# Patient Record
Sex: MALE | Race: BLACK OR AFRICAN AMERICAN | Employment: FULL TIME | URBAN - METROPOLITAN AREA
[De-identification: names, ages, dates, MRNs, and addresses within clinical notes are randomized per-mention and may not be internally consistent; named-entity substitution may affect disease eponyms.]

---

## 2017-02-28 ENCOUNTER — OFFICE VISIT (OUTPATIENT)
Dept: INTERNAL MEDICINE CLINIC | Age: 52
End: 2017-02-28

## 2017-02-28 VITALS
HEIGHT: 72 IN | DIASTOLIC BLOOD PRESSURE: 82 MMHG | OXYGEN SATURATION: 96 % | SYSTOLIC BLOOD PRESSURE: 120 MMHG | RESPIRATION RATE: 16 BRPM | HEART RATE: 65 BPM | BODY MASS INDEX: 33.51 KG/M2 | TEMPERATURE: 98.2 F | WEIGHT: 247.4 LBS

## 2017-02-28 DIAGNOSIS — J30.89 ENVIRONMENTAL AND SEASONAL ALLERGIES: ICD-10-CM

## 2017-02-28 DIAGNOSIS — N52.9 ERECTILE DYSFUNCTION, UNSPECIFIED ERECTILE DYSFUNCTION TYPE: ICD-10-CM

## 2017-02-28 DIAGNOSIS — E78.00 HIGH CHOLESTEROL: ICD-10-CM

## 2017-02-28 DIAGNOSIS — R73.03 PREDIABETES: Primary | ICD-10-CM

## 2017-02-28 RX ORDER — SILDENAFIL CITRATE 20 MG/1
40-60 TABLET ORAL
Qty: 20 TAB | Refills: 5 | Status: SHIPPED | OUTPATIENT
Start: 2017-02-28 | End: 2017-03-10

## 2017-02-28 NOTE — PROGRESS NOTES
Patient received paperwork for advance directive during previous visit but has not completed at this time     Reviewed record In preparation for visit and have obtained necessary documentation      1. Have you been to the ER, urgent care clinic since your last visit? Hospitalized since your last visit? NO    2. Have you seen or consulted any other health care providers outside of the [address] [place] [address] since your last visit? Include any pap smears or colon screening.  NO

## 2017-02-28 NOTE — PATIENT INSTRUCTIONS
Try taking allegra (fexofenadine) 180mg daily to see if it helps your cough. Allergies: Care Instructions  Your Care Instructions  Allergies occur when your body's defense system (immune system) overreacts to certain substances. The immune system treats a harmless substance as if it were a harmful germ or virus. Many things can cause this overreaction, including pollens, medicine, food, dust, animal dander, and mold. Allergies can be mild or severe. Mild allergies can be managed with home treatment. But medicine may be needed to prevent problems. Managing your allergies is an important part of staying healthy. Your doctor may suggest that you have allergy testing to help find out what is causing your allergies. When you know what things trigger your symptoms, you can avoid them. This can prevent allergy symptoms and other health problems. For severe allergies that cause reactions that affect your whole body (anaphylactic reactions), your doctor may prescribe a shot of epinephrine to carry with you in case you have a severe reaction. Learn how to give yourself the shot and keep it with you at all times. Make sure it is not . Follow-up care is a key part of your treatment and safety. Be sure to make and go to all appointments, and call your doctor if you are having problems. It's also a good idea to know your test results and keep a list of the medicines you take. How can you care for yourself at home? · If you have been told by your doctor that dust or dust mites are causing your allergy, decrease the dust around your bed:  [facility] sheets, pillowcases, and other bedding in hot water every week. ¨ Use dust-proof covers for pillows, duvets, and mattresses. Avoid plastic covers because they tear easily and do not \"breathe. \" Wash as instructed on the label. ¨ Do not use any blankets and pillows that you do not need. ¨ Use blankets that you can wash in your washing machine.   ¨ Consider removing drapes and carpets, which attract and hold dust, from your bedroom. · If you are allergic to house dust and mites, do not use home humidifiers. Your doctor can suggest ways you can control dust and mites. · Look for signs of cockroaches. Cockroaches cause allergic reactions. Use cockroach baits to get rid of them. Then, clean your home well. Cockroaches like areas where grocery bags, newspapers, empty bottles, or cardboard boxes are stored. Do not keep these inside your home, and keep trash and food containers sealed. Seal off any spots where cockroaches might enter your home. · If you are allergic to mold, get rid of furniture, rugs, and drapes that smell musty. Check for mold in the bathroom. · If you are allergic to outdoor pollen or mold spores, use air-conditioning. Change or clean all filters every month. Keep windows closed. · If you are allergic to pollen, stay inside when pollen counts are high. Use a vacuum  with a HEPA filter or a double-thickness filter at least two times each week. · Stay inside when air pollution is bad. Avoid paint fumes, perfumes, and other strong odors. · Avoid conditions that make your allergies worse. Stay away from smoke. Do not smoke or let anyone else smoke in your house. Do not use fireplaces or wood-burning stoves. · If you are allergic to your pets, change the air filter in your furnace every month. Use high-efficiency filters. · If you are allergic to pet dander, keep pets outside or out of your bedroom. Old carpet and cloth furniture can hold a lot of animal dander. You may need to replace them. When should you call for help? Give an epinephrine shot if:  · You think you are having a severe allergic reaction. · You have symptoms in more than one body area, such as mild nausea and an itchy mouth. After giving an epinephrine shot call 911, even if you feel better. Call 911 if:  · You have symptoms of a severe allergic reaction.  These may include:  ¨ Sudden raised, red areas (hives) all over your body. ¨ Swelling of the throat, mouth, lips, or tongue. ¨ Trouble breathing. ¨ Passing out (losing consciousness). Or you may feel very lightheaded or suddenly feel weak, confused, or restless. · You have been given an epinephrine shot, even if you feel better. Call your doctor now or seek immediate medical care if:  · You have symptoms of an allergic reaction, such as:  ¨ A rash or hives (raised, red areas on the skin). ¨ Itching. ¨ Swelling. ¨ Belly pain, nausea, or vomiting. Watch closely for changes in your health, and be sure to contact your doctor if:  · You do not get better as expected. Where can you learn more? Go to http://dee dee-miko.info/. Enter G755 in the search box to learn more about \"Allergies: Care Instructions. \"  Current as of: February 12, 2016  Content Version: 11.1  © 1553-9355 [employer]. Care instructions adapted under license by [employer] (which disclaims liability or warranty for this information). If you have questions about a medical condition or this instruction, always ask your healthcare professional. [address] any warranty or liability for your use of this information.

## 2017-02-28 NOTE — PROGRESS NOTES
HPI  Mr. Navjot Perez is a 54y.o. year old male, he is seen today for follow up high cholesterol, IFG, weight. Had cough with chest tightness, started about 3 weeks - getting better since he started taking theraflu. Sometimes cough is productive, no postnasal drip. No sob, f/c. No wheezing. Nasal congestion off and on, none persistent. Is taking pravastatin for several months, no rash. Has lost a few pounds - eating more grilled food, baked food, more vegetables and fruits - avoiding fast food. Says cialis worked for ED, but too expensive. Would like to try viagra again. Chief Complaint   Patient presents with    Cholesterol Problem     Room 1// fasting    Weight Management        Prior to Admission medications    Medication Sig Start Date End Date Taking? Authorizing Provider   sildenafil (REVATIO) 20 mg tablet Take 2-3 Tabs by mouth daily as needed. Indications: Erectile Dysfunction 2/28/17  Yes Tonny Hill MD   pravastatin (PRAVACHOL) 20 mg tablet TAKE 1 TAB BY MOUTH NIGHTLY. 8/17/16  Yes Mathieu Chery MD   triamcinolone acetonide (KENALOG) 0.1 % ointment Apply  to affected area two (2) times a day. use thin layer 4/21/16  Yes Tonny Hill MD   diclofenac (VOLTAREN) 1 % gel Apply 4 g to affected area four (4) times daily as needed for Pain. 3/21/16  Yes Tonny Hill MD   calcium carbonate (TUMS) 200 mg calcium (500 mg) Chew Take 1 Tab by mouth daily. Yes Historical Provider         Allergies   Allergen Reactions    Sulfa (Sulfonamide Antibiotics) Hives         REVIEW OF SYSTEMS:  Per HPI    PHYSICAL EXAM:  Visit Vitals    /82    Pulse 65    Temp 98.2 °F (36.8 °C) (Oral)    Resp 16    Ht 6' (1.829 m)    Wt 247 lb 6.4 oz (112.2 kg)    SpO2 96%    BMI 33.55 kg/m2     Constitutional: Appears well-developed and well-nourished. No distress. HENT:   Head: Normocephalic and atraumatic. Eyes: No scleral icterus.    Ears: tm's wnl   Nose: b/l nasal mucosa congestion  Mouth: OP clear without lesions, no pharyngeal exudate  Neck: no lad, no tm, supple   Cardiovascular: Normal S1/S2, regular rhythm. No murmurs, rubs, or gallops. Pulmonary/Chest: Effort normal and breath sounds normal. No respiratory distress. No wheezes, rhonchi, or rales. Ext: No edema. Neurological: Alert. Psychiatric: Normal mood and affect. Behavior is normal.     Lab Results   Component Value Date/Time    Sodium 138 10/21/2016 09:26 AM    Potassium 4.4 10/21/2016 09:26 AM    Chloride 99 10/21/2016 09:26 AM    CO2 25 10/21/2016 09:26 AM    Glucose 102 10/21/2016 09:26 AM    BUN 14 10/21/2016 09:26 AM    Creatinine 1.13 10/21/2016 09:26 AM    BUN/Creatinine ratio 12 10/21/2016 09:26 AM    GFR est AA 88 10/21/2016 09:26 AM    GFR est non-AA 76 10/21/2016 09:26 AM    Calcium 9.4 10/21/2016 09:26 AM    Bilirubin, total 0.3 10/21/2016 09:26 AM    AST (SGOT) 19 10/21/2016 09:26 AM    Alk. phosphatase 64 10/21/2016 09:26 AM    Protein, total 6.8 10/21/2016 09:26 AM    Albumin 4.5 10/21/2016 09:26 AM    A-G Ratio 2.0 10/21/2016 09:26 AM    ALT (SGPT) 20 10/21/2016 09:26 AM     Lab Results   Component Value Date/Time    Hemoglobin A1c 6.0 04/21/2016 11:04 AM    Hemoglobin A1c 5.9 10/05/2015 11:27 AM    Hemoglobin A1c 5.9 07/24/2015 11:39 AM      Lab Results   Component Value Date/Time    Cholesterol, total 257 10/21/2016 09:26 AM    HDL Cholesterol 49 10/21/2016 09:26 AM    LDL, calculated 158 10/21/2016 09:26 AM    VLDL, calculated 50 10/21/2016 09:26 AM    Triglyceride 249 10/21/2016 09:26 AM          ASSESSMENT/PLAN  Tran Richmond was seen today for cholesterol problem and weight management.     Diagnoses and all orders for this visit:    Prediabetes  -     HEMOGLOBIN A1C WITH EAG  Has made dietary changes - check a1c  High cholesterol  -     METABOLIC PANEL, COMPREHENSIVE  -     LIPID PANEL    BMI 33.0-33.9,adult  Continue portion control, exercise  Environmental and seasonal allergies  With cough - nasal congestion - try allegra 180mg daily prn  Erectile dysfunction, unspecified erectile dysfunction type  -     sildenafil (REVATIO) 20 mg tablet; Take 2-3 Tabs by mouth daily as needed. Indications: Erectile Dysfunction  Try revatio - normally less expensive            There are no preventive care reminders to display for this patient. Follow-up Disposition:  Return in about 6 months (around 8/28/2017), or if symptoms worsen or fail to improve, for bp, chol, weight, ifg.       Reviewed plan of care. Patient has provided input and agrees with goals. The nurse provided the patient and/or family with advanced directive information if needed and encouraged the patient to provide a copy to the office when available.

## 2017-02-28 NOTE — MR AVS SNAPSHOT
Visit Information Date & Time Provider Department Dept. Phone Encounter #  
 2/28/2017 11:00 AM Corona Pena MD [facility] [phone] [id number] Follow-up Instructions Return in about 6 months (around 8/28/2017), or if symptoms worsen or fail to improve, for bp, chol, weight, ifg. Upcoming Health Maintenance Date Due DTaP/Tdap/Td series (2 - Td) 3/30/2021 Allergies as of 2/28/2017  Review Complete On: 2/28/2017 By: Corona Pena MD  
  
 Severity Noted Reaction Type Reactions Sulfa (Sulfonamide Antibiotics)  03/29/2011    Hives Current Immunizations  Reviewed on 3/18/2015 Name Date TDAP Vaccine 3/30/2011 Not reviewed this visit You Were Diagnosed With   
  
 Codes Comments Prediabetes    -  Primary ICD-10-CM: R73.03 
ICD-9-CM: 790.29 High cholesterol     ICD-10-CM: E78.00 ICD-9-CM: 272.0 BMI 33.0-33.9,adult     ICD-10-CM: V61.41 
ICD-9-CM: V85.33 Environmental and seasonal allergies     ICD-10-CM: J30.89 ICD-9-CM: 477.8 Erectile dysfunction, unspecified erectile dysfunction type     ICD-10-CM: N52.9 ICD-9-CM: 607.84 Vitals BP  
  
  
  
  
  
 120/82 Vitals History BMI and BSA Data Body Mass Index Body Surface Area  
 33.55 kg/m 2 2.39 m 2 Preferred Pharmacy Pharmacy Name Phone [phone] [phone] Your Updated Medication List  
  
   
This list is accurate as of: 2/28/17 11:29 AM.  Always use your most recent med list.  
  
  
  
  
 calcium carbonate 200 mg calcium (500 mg) Sujey Tafoya Commonly known as:  TUMS Take 1 Tab by mouth daily. diclofenac 1 % Gel Commonly known as:  VOLTAREN Apply 4 g to affected area four (4) times daily as needed for Pain.  
  
 pravastatin 20 mg tablet Commonly known as:  PRAVACHOL  
TAKE 1 TAB BY MOUTH NIGHTLY. sildenafil 20 mg tablet Commonly known as:  REVATIO Take 2-3 Tabs by mouth daily as needed. Indications: Erectile Dysfunction  
  
 triamcinolone acetonide 0.1 % ointment Commonly known as:  KENALOG Apply  to affected area two (2) times a day. use thin layer Prescriptions Sent to Pharmacy Refills  
 sildenafil (REVATIO) 20 mg tablet 5 Sig: Take 2-3 Tabs by mouth daily as needed. Indications: Erectile Dysfunction Class: Normal  
 Pharmacy: [facility] [id number], [address] [place] [address] #: [phone] Route: Oral  
  
We Performed the Following HEMOGLOBIN A1C WITH EAG [96884 CPT(R)] LIPID PANEL [04625 CPT(R)] METABOLIC PANEL, COMPREHENSIVE [63474 CPT(R)] Follow-up Instructions Return in about 6 months (around 8/28/2017), or if symptoms worsen or fail to improve, for bp, chol, weight, ifg.  
  
  
Patient Instructions Try taking allegra (fexofenadine) 180mg daily to see if it helps your cough. Allergies: Care Instructions Your Care Instructions Allergies occur when your body's defense system (immune system) overreacts to certain substances. The immune system treats a harmless substance as if it were a harmful germ or virus. Many things can cause this overreaction, including pollens, medicine, food, dust, animal dander, and mold. Allergies can be mild or severe. Mild allergies can be managed with home treatment. But medicine may be needed to prevent problems. Managing your allergies is an important part of staying healthy. Your doctor may suggest that you have allergy testing to help find out what is causing your allergies. When you know what things trigger your symptoms, you can avoid them. This can prevent allergy symptoms and other health problems.  
For severe allergies that cause reactions that affect your whole body (anaphylactic reactions), your doctor may prescribe a shot of epinephrine to carry with you in case you have a severe reaction. Learn how to give yourself the shot and keep it with you at all times. Make sure it is not . Follow-up care is a key part of your treatment and safety. Be sure to make and go to all appointments, and call your doctor if you are having problems. It's also a good idea to know your test results and keep a list of the medicines you take. How can you care for yourself at home? · If you have been told by your doctor that dust or dust mites are causing your allergy, decrease the dust around your bed: 
[facility] sheets, pillowcases, and other bedding in hot water every week. ¨ Use dust-proof covers for pillows, duvets, and mattresses. Avoid plastic covers because they tear easily and do not \"breathe. \" Wash as instructed on the label. ¨ Do not use any blankets and pillows that you do not need. ¨ Use blankets that you can wash in your washing machine. ¨ Consider removing drapes and carpets, which attract and hold dust, from your bedroom. · If you are allergic to house dust and mites, do not use home humidifiers. Your doctor can suggest ways you can control dust and mites. · Look for signs of cockroaches. Cockroaches cause allergic reactions. Use cockroach baits to get rid of them. Then, clean your home well. Cockroaches like areas where grocery bags, newspapers, empty bottles, or cardboard boxes are stored. Do not keep these inside your home, and keep trash and food containers sealed. Seal off any spots where cockroaches might enter your home. · If you are allergic to mold, get rid of furniture, rugs, and drapes that smell musty. Check for mold in the bathroom. · If you are allergic to outdoor pollen or mold spores, use air-conditioning. Change or clean all filters every month. Keep windows closed. · If you are allergic to pollen, stay inside when pollen counts are high.  Use a vacuum  with a HEPA filter or a double-thickness filter at least two times each week. · Stay inside when air pollution is bad. Avoid paint fumes, perfumes, and other strong odors. · Avoid conditions that make your allergies worse. Stay away from smoke. Do not smoke or let anyone else smoke in your house. Do not use fireplaces or wood-burning stoves. · If you are allergic to your pets, change the air filter in your furnace every month. Use high-efficiency filters. · If you are allergic to pet dander, keep pets outside or out of your bedroom. Old carpet and cloth furniture can hold a lot of animal dander. You may need to replace them. When should you call for help? Give an epinephrine shot if: 
· You think you are having a severe allergic reaction. · You have symptoms in more than one body area, such as mild nausea and an itchy mouth. After giving an epinephrine shot call 911, even if you feel better. Call 911 if: 
· You have symptoms of a severe allergic reaction. These may include: 
¨ Sudden raised, red areas (hives) all over your body. ¨ Swelling of the throat, mouth, lips, or tongue. ¨ Trouble breathing. ¨ Passing out (losing consciousness). Or you may feel very lightheaded or suddenly feel weak, confused, or restless. · You have been given an epinephrine shot, even if you feel better. Call your doctor now or seek immediate medical care if: 
· You have symptoms of an allergic reaction, such as: ¨ A rash or hives (raised, red areas on the skin). ¨ Itching. ¨ Swelling. ¨ Belly pain, nausea, or vomiting. Watch closely for changes in your health, and be sure to contact your doctor if: 
· You do not get better as expected. Where can you learn more? Go to http://dee dee-miko.info/. Enter K454 in the search box to learn more about \"Allergies: Care Instructions. \" Current as of: February 12, 2016 Content Version: 11.1 © 7656-9249 [employer].  Care instructions adapted under license by [address] (which disclaims liability or warranty for this information). If you have questions about a medical condition or this instruction, always ask your healthcare professional. [address] any warranty or liability for your use of this information. Please provide this summary of care documentation to your next provider. Your primary care clinician is listed as Jacqueline Zamora. If you have any questions after today's visit, please call [phone].

## 2017-03-01 LAB
ALBUMIN SERPL-MCNC: 4.4 G/DL (ref 3.5–5.5)
ALBUMIN/GLOB SERPL: 2 {RATIO} (ref 1.1–2.5)
ALP SERPL-CCNC: 60 IU/L (ref 39–117)
ALT SERPL-CCNC: 21 IU/L (ref 0–44)
AST SERPL-CCNC: 22 IU/L (ref 0–40)
BILIRUB SERPL-MCNC: 0.5 MG/DL (ref 0–1.2)
BUN SERPL-MCNC: 12 MG/DL (ref 6–24)
BUN/CREAT SERPL: 11 (ref 9–20)
CALCIUM SERPL-MCNC: 9.1 MG/DL (ref 8.7–10.2)
CHLORIDE SERPL-SCNC: 102 MMOL/L (ref 96–106)
CHOLEST SERPL-MCNC: 197 MG/DL (ref 100–199)
CO2 SERPL-SCNC: 26 MMOL/L (ref 18–29)
CREAT SERPL-MCNC: 1.13 MG/DL (ref 0.76–1.27)
EST. AVERAGE GLUCOSE BLD GHB EST-MCNC: 123 MG/DL
GLOBULIN SER CALC-MCNC: 2.2 G/DL (ref 1.5–4.5)
GLUCOSE SERPL-MCNC: 100 MG/DL (ref 65–99)
HBA1C MFR BLD: 5.9 % (ref 4.8–5.6)
HDLC SERPL-MCNC: 54 MG/DL
INTERPRETATION, 910389: NORMAL
LDLC SERPL CALC-MCNC: 110 MG/DL (ref 0–99)
POTASSIUM SERPL-SCNC: 4.3 MMOL/L (ref 3.5–5.2)
PROT SERPL-MCNC: 6.6 G/DL (ref 6–8.5)
SODIUM SERPL-SCNC: 141 MMOL/L (ref 134–144)
TRIGL SERPL-MCNC: 165 MG/DL (ref 0–149)
VLDLC SERPL CALC-MCNC: 33 MG/DL (ref 5–40)

## 2017-03-10 RX ORDER — SILDENAFIL 50 MG/1
50 TABLET, FILM COATED ORAL AS NEEDED
Qty: 10 TAB | Refills: 5 | Status: SHIPPED | OUTPATIENT
Start: 2017-03-10 | End: 2018-03-01 | Stop reason: DRUGHIGH

## 2017-03-10 NOTE — TELEPHONE ENCOUNTER
Per Anna Saavedra, denied script for Revatio (sildenafil) but states it will cover Viagra (sildenafil), though Viagra may need a PA.

## 2017-03-24 ENCOUNTER — TELEPHONE (OUTPATIENT)
Dept: INTERNAL MEDICINE CLINIC | Age: 52
End: 2017-03-24

## 2017-03-24 NOTE — TELEPHONE ENCOUNTER
Pt is calling to find out the status of his prior auth for [employer]. Pt stated pharmacy faxed over request. Pt would like a call back to find out the status at [phone].

## 2017-03-24 NOTE — TELEPHONE ENCOUNTER
Per Brooke perez, [address] approved with code [id number] at [phone]. [facility] notified and will [facility] and states they will inform pt. Will have $30 copay for 10 tablets.

## 2017-08-28 ENCOUNTER — OFFICE VISIT (OUTPATIENT)
Dept: INTERNAL MEDICINE CLINIC | Age: 52
End: 2017-08-28

## 2017-08-28 VITALS
DIASTOLIC BLOOD PRESSURE: 80 MMHG | RESPIRATION RATE: 16 BRPM | BODY MASS INDEX: 33.54 KG/M2 | HEART RATE: 64 BPM | TEMPERATURE: 97.9 F | SYSTOLIC BLOOD PRESSURE: 126 MMHG | HEIGHT: 72 IN | WEIGHT: 247.6 LBS | OXYGEN SATURATION: 96 %

## 2017-08-28 DIAGNOSIS — E78.00 HIGH CHOLESTEROL: ICD-10-CM

## 2017-08-28 DIAGNOSIS — J30.9 ALLERGIC RHINITIS, UNSPECIFIED ALLERGIC RHINITIS TRIGGER, UNSPECIFIED RHINITIS SEASONALITY: ICD-10-CM

## 2017-08-28 DIAGNOSIS — R73.01 IFG (IMPAIRED FASTING GLUCOSE): Primary | ICD-10-CM

## 2017-08-28 RX ORDER — FLUTICASONE PROPIONATE 50 MCG
2 SPRAY, SUSPENSION (ML) NASAL DAILY
Qty: 1 BOTTLE | Refills: 2 | Status: SHIPPED | OUTPATIENT
Start: 2017-08-28 | End: 2018-07-02 | Stop reason: SDUPTHER

## 2017-08-28 NOTE — PATIENT INSTRUCTIONS
Prediabetes: Care Instructions  Your Care Instructions  Prediabetes is a warning sign that you are at risk for getting type 2 diabetes. It means that your blood sugar is higher than it should be. The food you eat turns into sugar, which your body uses for energy. Normally, an organ called the pancreas makes insulin, which allows the sugar in your blood to get into your body's cells. But when your body can't use insulin the right way, the sugar doesn't move into cells. It stays in your blood instead. This is called insulin resistance. The buildup of sugar in the blood causes prediabetes. The good news is that lifestyle changes may help you get your blood sugar back to normal and help you avoid or delay diabetes. Follow-up care is a key part of your treatment and safety. Be sure to make and go to all appointments, and call your doctor if you are having problems. It's also a good idea to know your test results and keep a list of the medicines you take. How can you care for yourself at home? · Watch your weight. A healthy weight helps your body use insulin properly. · Limit the amount of calories, sweets, and unhealthy fat you eat. Ask your doctor if you should see a dietitian. A registered dietitian can help you create meal plans that fit your lifestyle. · Get at least 30 minutes of exercise on most days of the week. Exercise helps control your blood sugar. It also helps you maintain a healthy weight. Walking is a good choice. You also may want to do other activities, such as running, swimming, cycling, or playing tennis or team sports. · Do not smoke. Smoking can make prediabetes worse. If you need help quitting, talk to your doctor about stop-smoking programs and medicines. These can increase your chances of quitting for good. · If your doctor prescribed medicines, take them exactly as prescribed. Call your doctor if you think you are having a problem with your medicine.  You will get more details on the specific medicines your doctor prescribes. When should you call for help? Watch closely for changes in your health, and be sure to contact your doctor if:  · You have any symptoms of diabetes. These may include:  ¨ Being thirsty more often. ¨ Urinating more. ¨ Being hungrier. ¨ Losing weight. ¨ Being very tired. ¨ Having blurry vision. · You have a wound that will not heal.  · You have an infection that will not go away. · You have problems with your blood pressure. · You want more information about diabetes and how you can keep from getting it. Where can you learn more? Go to http://dee dee-miko.info/. Enter I222 in the search box to learn more about \"Prediabetes: Care Instructions. \"  Current as of: March 13, 2017  Content Version: 11.3  © 8673-8571 [employer]. Care instructions adapted under license by [employer] (which disclaims liability or warranty for this information). If you have questions about a medical condition or this instruction, always ask your healthcare professional. [address] any warranty or liability for your use of this information.

## 2017-08-28 NOTE — MR AVS SNAPSHOT
Visit Information Date & Time Provider Department Dept. Phone Encounter #  
 8/28/2017 10:00 AM Mindy Rdz, [address] [facility] [phone] [id number] Follow-up Instructions Return in about 6 months (around 2/28/2018). Upcoming Health Maintenance Date Due DTaP/Tdap/Td series (2 - Td) 3/30/2021 Allergies as of 8/28/2017  Review Complete On: 8/28/2017 By: Mindy Rdz MD  
  
 Severity Noted Reaction Type Reactions Sulfa (Sulfonamide Antibiotics)  03/29/2011    Hives Current Immunizations  Reviewed on 3/18/2015 Name Date TDAP Vaccine 3/30/2011 Not reviewed this visit You Were Diagnosed With   
  
 Codes Comments IFG (impaired fasting glucose)    -  Primary ICD-10-CM: R73.01 
ICD-9-CM: 790.21 High cholesterol     ICD-10-CM: E78.00 ICD-9-CM: 272.0 Allergic rhinitis, unspecified allergic rhinitis trigger, unspecified rhinitis seasonality     ICD-10-CM: J30.9 ICD-9-CM: 477.9 BMI 33.0-33.9,adult     ICD-10-CM: H29.33 
ICD-9-CM: V85.33 Vitals BP Pulse Temp Resp Height(growth percentile) Weight(growth percentile) 126/80 64 97.9 °F (36.6 °C) (Oral) 16 6' (1.829 m) 247 lb 9.6 oz (112.3 kg) SpO2 BMI Smoking Status 96% 33.58 kg/m2 Never Smoker Vitals History BMI and BSA Data Body Mass Index Body Surface Area 33.58 kg/m 2 2.39 m 2 Preferred Pharmacy Pharmacy Name Phone [phone] [facility] [phone] Your Updated Medication List  
  
   
This list is accurate as of: 8/28/17 10:44 AM.  Always use your most recent med list.  
  
  
  
  
 calcium carbonate 200 mg calcium (500 mg) Martinez Commonly known as:  TUMS Take 1 Tab by mouth daily. fluticasone 50 mcg/actuation nasal spray Commonly known as:  Jennifer Hoot 2 Sprays by Both Nostrils route daily. pravastatin 20 mg tablet Commonly known as:  PRAVACHOL  
TAKE 1 TAB BY MOUTH NIGHTLY.  
  
 sildenafil citrate 50 mg tablet Commonly known as:  VIAGRA Take 1 Tab by mouth as needed. Prescriptions Sent to Pharmacy Refills  
 fluticasone (FLONASE) 50 mcg/actuation nasal spray 2 Si Sprays by Both Nostrils route daily. Class: Normal  
 Pharmacy: [address] #: [phone] Route: Both Nostrils We Performed the Following HEMOGLOBIN A1C WITH EAG [10919 CPT(R)] LIPID PANEL [69384 CPT(R)] METABOLIC PANEL, COMPREHENSIVE [19374 CPT(R)] Follow-up Instructions Return in about 6 months (around 2018). Patient Instructions Prediabetes: Care Instructions Your Care Instructions Prediabetes is a warning sign that you are at risk for getting type 2 diabetes. It means that your blood sugar is higher than it should be. The food you eat turns into sugar, which your body uses for energy. Normally, an organ called the pancreas makes insulin, which allows the sugar in your blood to get into your body's cells. But when your body can't use insulin the right way, the sugar doesn't move into cells. It stays in your blood instead. This is called insulin resistance. The buildup of sugar in the blood causes prediabetes. The good news is that lifestyle changes may help you get your blood sugar back to normal and help you avoid or delay diabetes. Follow-up care is a key part of your treatment and safety. Be sure to make and go to all appointments, and call your doctor if you are having problems. It's also a good idea to know your test results and keep a list of the medicines you take. How can you care for yourself at home? · Watch your weight. A healthy weight helps your body use insulin properly. · Limit the amount of calories, sweets, and unhealthy fat you eat.  Ask your doctor if you should see a dietitian. A registered dietitian can help you create meal plans that fit your lifestyle. · Get at least 30 minutes of exercise on most days of the week. Exercise helps control your blood sugar. It also helps you maintain a healthy weight. Walking is a good choice. You also may want to do other activities, such as running, swimming, cycling, or playing tennis or team sports. · Do not smoke. Smoking can make prediabetes worse. If you need help quitting, talk to your doctor about stop-smoking programs and medicines. These can increase your chances of quitting for good. · If your doctor prescribed medicines, take them exactly as prescribed. Call your doctor if you think you are having a problem with your medicine. You will get more details on the specific medicines your doctor prescribes. When should you call for help? Watch closely for changes in your health, and be sure to contact your doctor if: 
· You have any symptoms of diabetes. These may include: ¨ Being thirsty more often. ¨ Urinating more. ¨ Being hungrier. ¨ Losing weight. ¨ Being very tired. ¨ Having blurry vision. · You have a wound that will not heal. 
· You have an infection that will not go away. · You have problems with your blood pressure. · You want more information about diabetes and how you can keep from getting it. Where can you learn more? Go to http://dee dee-miko.info/. Enter I222 in the search box to learn more about \"Prediabetes: Care Instructions. \" Current as of: March 13, 2017 Content Version: 11.3 © 1957-9294 [employer]. Care instructions adapted under license by [employer] (which disclaims liability or warranty for this information). If you have questions about a medical condition or this instruction, always ask your healthcare professional. [address] any warranty or liability for your use of this information. Introducing \[facility] [place] [facility]\"" & HEALTH SERVICES! Dear Roque Ascencio: Thank you for requesting a [employer] account. Our records indicate that you have previously registered for a [employer] account but its currently inactive. Please call our [employer] support line at [phone]. Additional Information If you have questions, please visit the Frequently Asked Questions section of the [employer] website at [email or web address] Remember, [employer] is NOT to be used for urgent needs. For medical emergencies, dial 911. Now available from your iPhone and Android! Please provide this summary of care documentation to your next provider. Your primary care clinician is listed as Jacqueline Zamora. If you have any questions after today's visit, please call [phone].

## 2017-08-28 NOTE — PROGRESS NOTES
Ministerio Mota  Identified pt with two pt identifiers(name and ). Chief Complaint   Patient presents with    Blood Pressure Check     Room 2// Fasting     Cholesterol Problem       1. Have you been to the ER, urgent care clinic since your last visit? Hospitalized since your last visit? NO    2. Have you seen or consulted any other health care providers outside of the [address] [place] [address] since your last visit? Include any pap smears or colon screening. NO      Dr Brenton Montgomery notified of reason for visit and vitals    Patient received paperwork for advance directive during previous visit but has not completed at this time     Reviewed record In preparation for visit, huddled with provider and have obtained necessary documentation      There are no preventive care reminders to display for this patient. Wt Readings from Last 3 Encounters:   17 247 lb 9.6 oz (112.3 kg)   17 247 lb 6.4 oz (112.2 kg)   10/21/16 249 lb 3.2 oz (113 kg)     Temp Readings from Last 3 Encounters:   17 98.2 °F (36.8 °C) (Oral)   10/21/16 98.2 °F (36.8 °C) (Oral)   16 98.4 °F (36.9 °C)     BP Readings from Last 3 Encounters:   17 120/82   10/21/16 128/84   16 148/84     Pulse Readings from Last 3 Encounters:   17 65   10/21/16 (!) 57   16 75         Learning Assessment:  :     Learning Assessment 2014   PRIMARY LEARNER Patient   PRIMARY LANGUAGE ENGLISH   LEARNER PREFERENCE PRIMARY DEMONSTRATION   ANSWERED BY patient   RELATIONSHIP SELF       Depression Screening:  :     PHQ over the last two weeks 3/21/2016   Little interest or pleasure in doing things Not at all   Feeling down, depressed or hopeless Not at all   Total Score PHQ 2 0       Fall Risk Assessment:  :     No flowsheet data found. Abuse Screening:  :     No flowsheet data found.       I have received verbal consent from Ministerio Mota to discuss any/all medical information while they are present in the room.    Medication reconciliation up to date and corrected with patient at this time.

## 2017-08-28 NOTE — PROGRESS NOTES
HPI  Mr. Leslie Daniel is a 54y.o. year old male, he is seen today for follow up high cholesterol, IFG, BP. No cp, sob, dizziness, weakness, HA, vision changes. Had been taking allegra for post nasal drip - not helping. Went on vacation about a month ago, noticed postnasal drip, took dayquil and felt better but symptoms have since persisted but improved. Some sneezing and itchy, watery eyes at times. No f/c. Diet is mostly grilled, broiled, baked foods, trying to incorporate more fruits/vegetables, nuts. Very active at work - crawling, climbing, walking. Doesn't check bp. Weight stable. Chief Complaint   Patient presents with    Blood Pressure Check     Room 2// Fasting     Cholesterol Problem        Prior to Admission medications    Medication Sig Start Date End Date Taking? Authorizing Provider   sildenafil citrate (VIAGRA) 50 mg tablet Take 1 Tab by mouth as needed. 3/10/17  Yes Sapna Brock MD   pravastatin (PRAVACHOL) 20 mg tablet TAKE 1 TAB BY MOUTH NIGHTLY. 8/17/16  Yes Chiara Galarza MD   calcium carbonate (TUMS) 200 mg calcium (500 mg) Chew Take 1 Tab by mouth daily. Yes Historical Provider   triamcinolone acetonide (KENALOG) 0.1 % ointment Apply  to affected area two (2) times a day. use thin layer 4/21/16   Sapna Brock MD   diclofenac (VOLTAREN) 1 % gel Apply 4 g to affected area four (4) times daily as needed for Pain. 3/21/16   Sapna Brock MD         Allergies   Allergen Reactions    Sulfa (Sulfonamide Antibiotics) Hives         REVIEW OF SYSTEMS:  Per HPI    PHYSICAL EXAM:  Visit Vitals    /80    Pulse 64    Temp 97.9 °F (36.6 °C) (Oral)    Resp 16    Ht 6' (1.829 m)    Wt 247 lb 9.6 oz (112.3 kg)    SpO2 96%    BMI 33.58 kg/m2     Constitutional: Appears well-developed and well-nourished. No distress. HENT:   Head: Normocephalic and atraumatic. Eyes: No scleral icterus.    Nose: b/l nasal mucosa congested  Mouth: OP clear without lesions, no pharyngeal exudate  Neck: no lad, no tm, supple   Cardiovascular: Normal S1/S2, regular rhythm. No murmurs, rubs, or gallops. Pulmonary/Chest: Effort normal and breath sounds normal. No respiratory distress. No wheezes, rhonchi, or rales. Ext: No edema. Neurological: Alert. Psychiatric: Normal mood and affect. Behavior is normal.     Lab Results   Component Value Date/Time    Sodium 141 02/28/2017 11:34 AM    Potassium 4.3 02/28/2017 11:34 AM    Chloride 102 02/28/2017 11:34 AM    CO2 26 02/28/2017 11:34 AM    Glucose 100 02/28/2017 11:34 AM    BUN 12 02/28/2017 11:34 AM    Creatinine 1.13 02/28/2017 11:34 AM    BUN/Creatinine ratio 11 02/28/2017 11:34 AM    GFR est AA 88 02/28/2017 11:34 AM    GFR est non-AA 76 02/28/2017 11:34 AM    Calcium 9.1 02/28/2017 11:34 AM    Bilirubin, total 0.5 02/28/2017 11:34 AM    AST (SGOT) 22 02/28/2017 11:34 AM    Alk. phosphatase 60 02/28/2017 11:34 AM    Protein, total 6.6 02/28/2017 11:34 AM    Albumin 4.4 02/28/2017 11:34 AM    A-G Ratio 2.0 02/28/2017 11:34 AM    ALT (SGPT) 21 02/28/2017 11:34 AM     Lab Results   Component Value Date/Time    Hemoglobin A1c 5.9 02/28/2017 11:34 AM    Hemoglobin A1c 6.0 04/21/2016 11:04 AM    Hemoglobin A1c 5.9 10/05/2015 11:27 AM      Lab Results   Component Value Date/Time    Cholesterol, total 197 02/28/2017 11:34 AM    HDL Cholesterol 54 02/28/2017 11:34 AM    LDL, calculated 110 02/28/2017 11:34 AM    VLDL, calculated 33 02/28/2017 11:34 AM    Triglyceride 165 02/28/2017 11:34 AM          ASSESSMENT/PLAN  Diagnoses and all orders for this visit:    1. IFG (impaired fasting glucose)  -     HEMOGLOBIN A1C WITH EAG  Check today - has been eating more low carb diet  2. High cholesterol  -     LIPID PANEL  -     METABOLIC PANEL, COMPREHENSIVE  Check lipids today  3.  Allergic rhinitis, unspecified allergic rhinitis trigger, unspecified rhinitis seasonality  -     fluticasone (FLONASE) 50 mcg/actuation nasal spray; 2 Sprays by Both Nostrils route daily. 4. BMI 33.0-33.9,adult  Weight stable, suspect BMI high partially due to muscle mass - continue lower carb diet and exercise      There are no preventive care reminders to display for this patient. Follow-up Disposition:  Return in about 6 months (around 2/28/2018). Reviewed plan of care. Patient has provided input and agrees with goals. The nurse provided the patient and/or family with advanced directive information if needed and encouraged the patient to provide a copy to the office when available.

## 2017-08-29 LAB
ALBUMIN SERPL-MCNC: 4.5 G/DL (ref 3.5–5.5)
ALBUMIN/GLOB SERPL: 1.6 {RATIO} (ref 1.2–2.2)
ALP SERPL-CCNC: 74 IU/L (ref 39–117)
ALT SERPL-CCNC: 16 IU/L (ref 0–44)
AST SERPL-CCNC: 18 IU/L (ref 0–40)
BILIRUB SERPL-MCNC: 0.4 MG/DL (ref 0–1.2)
BUN SERPL-MCNC: 16 MG/DL (ref 6–24)
BUN/CREAT SERPL: 15 (ref 9–20)
CALCIUM SERPL-MCNC: 9.4 MG/DL (ref 8.7–10.2)
CHLORIDE SERPL-SCNC: 99 MMOL/L (ref 96–106)
CHOLEST SERPL-MCNC: 280 MG/DL (ref 100–199)
CO2 SERPL-SCNC: 24 MMOL/L (ref 18–29)
CREAT SERPL-MCNC: 1.07 MG/DL (ref 0.76–1.27)
EST. AVERAGE GLUCOSE BLD GHB EST-MCNC: 117 MG/DL
GLOBULIN SER CALC-MCNC: 2.9 G/DL (ref 1.5–4.5)
GLUCOSE SERPL-MCNC: 84 MG/DL (ref 65–99)
HBA1C MFR BLD: 5.7 % (ref 4.8–5.6)
HDLC SERPL-MCNC: 56 MG/DL
INTERPRETATION, 910389: NORMAL
LDLC SERPL CALC-MCNC: 176 MG/DL (ref 0–99)
POTASSIUM SERPL-SCNC: 4.4 MMOL/L (ref 3.5–5.2)
PROT SERPL-MCNC: 7.4 G/DL (ref 6–8.5)
SODIUM SERPL-SCNC: 139 MMOL/L (ref 134–144)
TRIGL SERPL-MCNC: 241 MG/DL (ref 0–149)
VLDLC SERPL CALC-MCNC: 48 MG/DL (ref 5–40)

## 2018-03-01 ENCOUNTER — OFFICE VISIT (OUTPATIENT)
Dept: INTERNAL MEDICINE CLINIC | Age: 53
End: 2018-03-01

## 2018-03-01 VITALS
DIASTOLIC BLOOD PRESSURE: 83 MMHG | OXYGEN SATURATION: 96 % | HEART RATE: 66 BPM | WEIGHT: 253.4 LBS | SYSTOLIC BLOOD PRESSURE: 132 MMHG | BODY MASS INDEX: 34.32 KG/M2 | RESPIRATION RATE: 14 BRPM | HEIGHT: 72 IN | TEMPERATURE: 98 F

## 2018-03-01 DIAGNOSIS — E78.00 HIGH CHOLESTEROL: ICD-10-CM

## 2018-03-01 DIAGNOSIS — N52.9 ERECTILE DYSFUNCTION, UNSPECIFIED ERECTILE DYSFUNCTION TYPE: ICD-10-CM

## 2018-03-01 DIAGNOSIS — Z12.5 PROSTATE CANCER SCREENING: ICD-10-CM

## 2018-03-01 DIAGNOSIS — R73.03 PREDIABETES: ICD-10-CM

## 2018-03-01 DIAGNOSIS — Z12.11 SCREENING FOR COLON CANCER: ICD-10-CM

## 2018-03-01 DIAGNOSIS — R73.01 IFG (IMPAIRED FASTING GLUCOSE): Primary | ICD-10-CM

## 2018-03-01 RX ORDER — SILDENAFIL CITRATE 20 MG/1
20-60 TABLET ORAL
Qty: 30 TAB | Refills: 4 | Status: SHIPPED | OUTPATIENT
Start: 2018-03-01 | End: 2019-04-29 | Stop reason: SDUPTHER

## 2018-03-01 NOTE — MR AVS SNAPSHOT
[address] [place] [address] [address] [address] [phone] Patient: Michael Roman MRN: [id number] :[id number] Visit Information Date & Time Provider Department Dept. Phone Encounter #  
 3/1/2018  8:30 AM MD Mitch Escobar Irvingashli [phone] [id number] Follow-up Instructions Return in about 6 months (around 2018) for chol, ifg. Upcoming Health Maintenance Date Due FOBT Q 1 YEAR AGE 50-75 10/5/2017 DTaP/Tdap/Td series (2 - Td) 3/30/2021 Allergies as of 3/1/2018  Review Complete On: 3/1/2018 By: Cecy Rodriguez LPN Severity Noted Reaction Type Reactions Sulfa (Sulfonamide Antibiotics)  2011    Hives Current Immunizations  Reviewed on 3/18/2015 Name Date TDAP Vaccine 3/30/2011 Not reviewed this visit You Were Diagnosed With   
  
 Codes Comments IFG (impaired fasting glucose)    -  Primary ICD-10-CM: R73.01 
ICD-9-CM: 790.21 Screening for colon cancer     ICD-10-CM: Z12.11 ICD-9-CM: V76.51 Prediabetes     ICD-10-CM: R73.03 
ICD-9-CM: 790.29 High cholesterol     ICD-10-CM: E78.00 ICD-9-CM: 272.0 Prostate cancer screening     ICD-10-CM: Z12.5 ICD-9-CM: V76.44 BMI 34.0-34.9,adult     ICD-10-CM: V27.36 
ICD-9-CM: V85.34 Erectile dysfunction, unspecified erectile dysfunction type     ICD-10-CM: N52.9 ICD-9-CM: 607.84 Vitals BP Pulse Temp Resp Height(growth percentile) Weight(growth percentile) 132/83 (BP 1 Location: Right arm, BP Patient Position: Sitting) 66 98 °F (36.7 °C) (Oral) 14 6' (1.829 m) 253 lb 6.4 oz (114.9 kg) SpO2 BMI Smoking Status 96% 34.37 kg/m2 Never Smoker Vitals History BMI and BSA Data Body Mass Index Body Surface Area  
 34.37 kg/m 2 2.42 m 2 Preferred Pharmacy Pharmacy Name Phone [id number] [phone] Your Updated Medication List  
  
   
This list is accurate as of 3/1/18  9:33 AM.  Always use your most recent med list.  
  
  
  
  
 calcium carbonate 200 mg calcium (500 mg) Pierre Tan Commonly known as:  TUMS Take 1 Tab by mouth daily. fluticasone 50 mcg/actuation nasal spray Commonly known as:  Selena Finders 2 Sprays by Both Nostrils route daily. pravastatin 20 mg tablet Commonly known as:  PRAVACHOL  
TAKE 1 TAB BY MOUTH NIGHTLY.  
  
 sildenafil (antihypertensive) 20 mg tablet Commonly known as:  REVATIO Take 1-3 Tabs by mouth daily as needed. Prescriptions Printed Refills  
 sildenafil, antihypertensive, (REVATIO) 20 mg tablet 4 Sig: Take 1-3 Tabs by mouth daily as needed. Class: Print Route: Oral  
  
We Performed the Following HEMOGLOBIN A1C WITH EAG [37247 CPT(R)] LIPID PANEL [04422 CPT(R)] METABOLIC PANEL, COMPREHENSIVE [54931 CPT(R)] PSA, DIAGNOSTIC (PROSTATE SPECIFIC AG) [id number] CPT(R)] REFERRAL TO GASTROENTEROLOGY [AZO30 Custom] Comments:  
 Please evaluate patient for colonoscopy. Follow-up Instructions Return in about 6 months (around 9/1/2018) for chol, ifg.  
  
  
Referral Information Referral ID Referred By Referred To  
  
 [id number] Marlene VILLEDA [facility] [address] [place], [address] [place] [address] Visits Status Start Date End Date 1 New Request 3/1/18 3/1/19 If your referral has a status of pending review or denied, additional information will be sent to support the outcome of this decision. Introducing [address]! [facility] introduces [employer] patient portal. Now you can access parts of your medical record, email your doctor's office, and request medication refills online.    
 
1. In your internet browser, go to [email or web address] 2. Click on the First Time User? Click Here link in the Sign In box. You will see the New Member Sign Up page. 3. Enter your [employer] Access Code exactly as it appears below. You will not need to use this code after youve completed the sign-up process. If you do not sign up before the expiration date, you must request a new code. · [employer] Access Code: [email or web address] Expires: 5/30/2018  9:26 AM 
 
4. Enter the last four digits of your Social Security Number (xxxx) and Date of Birth (mm/dd/yyyy) as indicated and click Submit. You will be taken to the next sign-up page. 5. Create a [employer] ID. This will be your [employer] login ID and cannot be changed, so think of one that is secure and easy to remember. 6. Create a [employer] password. You can change your password at any time. 7. Enter your Password Reset Question and Answer. This can be used at a later time if you forget your password. 8. Enter your e-mail address. You will receive e-mail notification when new information is available in [address] 9. Click Sign Up. You can now view and download portions of your medical record. 10. Click the Download Summary menu link to download a portable copy of your medical information. If you have questions, please visit the Frequently Asked Questions section of the [employer] website. Remember, [employer] is NOT to be used for urgent needs. For medical emergencies, dial 911. Now available from your iPhone and Android! Please provide this summary of care documentation to your next provider. Your primary care clinician is listed as Jacqueline Zamora. If you have any questions after today's visit, please call [phone].

## 2018-03-01 NOTE — PROGRESS NOTES
[facility]  Identified pt with two pt identifiers(name and ). Chief Complaint   Patient presents with    Blood sugar problem     Room 2// Fasting     Cholesterol Problem       1. Have you been to the ER, urgent care clinic since your last visit? Hospitalized since your last visit? NO    2. Have you seen or consulted any other health care providers outside of the [address] [place] [address] since your last visit? Include any pap smears or colon screening. NO      Dr Guru Barger notified of reason for visit, vitals and flowsheets obtained on patients. Patient received paperwork for advance directive during previous visit but has not completed at this time     Reviewed record In preparation for visit, huddled with provider and have obtained necessary documentation      Health Maintenance Due   Topic    FOBT Q 1 YEAR AGE 50-75        Wt Readings from Last 3 Encounters:   18 253 lb 6.4 oz (114.9 kg)   17 247 lb 9.6 oz (112.3 kg)   17 247 lb 6.4 oz (112.2 kg)     Temp Readings from Last 3 Encounters:   17 97.9 °F (36.6 °C) (Oral)   17 98.2 °F (36.8 °C) (Oral)   10/21/16 98.2 °F (36.8 °C) (Oral)     BP Readings from Last 3 Encounters:   17 126/80   17 120/82   10/21/16 128/84     Pulse Readings from Last 3 Encounters:   17 64   17 65   10/21/16 (!) 57     Vitals:    18 0834   Weight: 253 lb 6.4 oz (114.9 kg)   Height: 6' (1.829 m)   PainSc:   0 - No pain         Learning Assessment:  :     Learning Assessment 2014   PRIMARY LEARNER Patient   PRIMARY LANGUAGE ENGLISH   LEARNER PREFERENCE PRIMARY DEMONSTRATION   ANSWERED BY patient   RELATIONSHIP SELF       Depression Screening:  :     PHQ over the last two weeks 2017   Little interest or pleasure in doing things Not at all   Feeling down, depressed or hopeless Not at all   Total Score PHQ 2 0       Fall Risk Assessment:  :     Fall Risk Assessment, last 12 mths 2017   Able to walk?  Yes Fall in past 12 months? No       Abuse Screening:  :     Abuse Screening Questionnaire 8/28/2017   Do you ever feel afraid of your partner? N   Are you in a relationship with someone who physically or mentally threatens you? N   Is it safe for you to go home? Y       ADL Screening:  :     ADL Assessment 8/28/2017   Feeding yourself No Help Needed   Getting from bed to chair No Help Needed   Getting dressed No Help Needed   Bathing or showering No Help Needed   Walk across the room (includes cane/walker) No Help Needed   Using the telphone No Help Needed   Taking your medications No Help Needed   Preparing meals No Help Needed   Managing money (expenses/bills) No Help Needed   Moderately strenuous housework (laundry) No Help Needed   Shopping for personal items (toiletries/medicines) No Help Needed   Shopping for groceries No Help Needed   Driving No Help Needed   Climbing a flight of stairs No Help Needed   Getting to places beyond walking distances No Help Needed                I have received verbal consent from Lenora Arias to discuss any/all medical information while they are present in the room. Medication reconciliation up to date and corrected with patient at this time.

## 2018-03-01 NOTE — PROGRESS NOTES
HPI  Mr. Melinda Garrison is a 50y.o. year old male, he is seen today for follow up high cholesterol. Has been well since last visit. No chest pain, sob, dizziness, weakness. No change in bowels, melena or brbpr. No change in urinary frequency, urgency or emptying bladder. Active at work, no intentional exercise. Chief Complaint   Patient presents with    Blood sugar problem     Room 2// Fasting     Cholesterol Problem        Prior to Admission medications    Medication Sig Start Date End Date Taking? Authorizing Provider   pravastatin (PRAVACHOL) 20 mg tablet TAKE 1 TAB BY MOUTH NIGHTLY. 9/15/17  Yes Shaan Kay MD   sildenafil citrate (VIAGRA) 50 mg tablet Take 1 Tab by mouth as needed. 3/10/17  Yes Shaan Kay MD   calcium carbonate (TUMS) 200 mg calcium (500 mg) Chew Take 1 Tab by mouth daily. Yes Historical Provider   fluticasone (FLONASE) 50 mcg/actuation nasal spray 2 Sprays by Both Nostrils route daily. 8/28/17   Shaan Kay MD         Allergies   Allergen Reactions    Sulfa (Sulfonamide Antibiotics) Hives         REVIEW OF SYSTEMS:  Per HPI    PHYSICAL EXAM:  Visit Vitals    /83 (BP 1 Location: Right arm, BP Patient Position: Sitting)    Pulse 66    Temp 98 °F (36.7 °C) (Oral)    Resp 14    Ht 6' (1.829 m)    Wt 253 lb 6.4 oz (114.9 kg)    SpO2 96%    BMI 34.37 kg/m2     Constitutional: Appears well-developed and well-nourished. No distress. HENT:   Head: Normocephalic and atraumatic. Eyes: No scleral icterus. Cardiovascular: Normal S1/S2, regular rhythm. No murmurs, rubs, or gallops. Pulmonary/Chest: Effort normal and breath sounds normal. No respiratory distress. No wheezes, rhonchi, or rales. Rectal: normal tone, prostate smooth, nontender, no nodules, not enlarged, no rectal masses   Ext: No edema. Neurological: Alert. Psychiatric: Normal mood and affect.  Behavior is normal.     Lab Results   Component Value Date/Time    Sodium 139 08/28/2017 10:48 AM    Potassium 4.4 08/28/2017 10:48 AM    Chloride 99 08/28/2017 10:48 AM    CO2 24 08/28/2017 10:48 AM    Glucose 84 08/28/2017 10:48 AM    BUN 16 08/28/2017 10:48 AM    Creatinine 1.07 08/28/2017 10:48 AM    BUN/Creatinine ratio 15 08/28/2017 10:48 AM    GFR est AA 94 08/28/2017 10:48 AM    GFR est non-AA 81 08/28/2017 10:48 AM    Calcium 9.4 08/28/2017 10:48 AM    Bilirubin, total 0.4 08/28/2017 10:48 AM    AST (SGOT) 18 08/28/2017 10:48 AM    Alk. phosphatase 74 08/28/2017 10:48 AM    Protein, total 7.4 08/28/2017 10:48 AM    Albumin 4.5 08/28/2017 10:48 AM    A-G Ratio 1.6 08/28/2017 10:48 AM    ALT (SGPT) 16 08/28/2017 10:48 AM     Lab Results   Component Value Date/Time    Hemoglobin A1c 5.7 (H) 08/28/2017 10:48 AM    Hemoglobin A1c 5.9 (H) 02/28/2017 11:34 AM    Hemoglobin A1c 6.0 (H) 04/21/2016 11:04 AM      Lab Results   Component Value Date/Time    Cholesterol, total 280 (H) 08/28/2017 10:48 AM    HDL Cholesterol 56 08/28/2017 10:48 AM    LDL, calculated 176 (H) 08/28/2017 10:48 AM    VLDL, calculated 48 (H) 08/28/2017 10:48 AM    Triglyceride 241 (H) 08/28/2017 10:48 AM          ASSESSMENT/PLAN  Diagnoses and all orders for this visit:    1. IFG (impaired fasting glucose)  -     HEMOGLOBIN A1C WITH EAG    2. Screening for colon cancer  -     REFERRAL TO GASTROENTEROLOGY    3. Prediabetes  Labs today  4. High cholesterol  -     LIPID PANEL  -     METABOLIC PANEL, COMPREHENSIVE  Check labs - no side effects from pravastatin  5. Prostate cancer screening  -     PROSTATE SPECIFIC AG    6. BMI 34.0-34.9,adult  Close to healthy weight due to muscle mass  7. Erectile dysfunction, unspecified erectile dysfunction type  -     sildenafil, antihypertensive, (REVATIO) 20 mg tablet; Take 1-3 Tabs by mouth daily as needed.     Other orders  -     CVD REPORT          Health Maintenance Due   Topic Date Due    FOBT Q 1 YEAR AGE 50-75  10/05/2017        Follow-up Disposition:  Return in about 6 months (around 9/1/2018) for chol, ifg.       Reviewed plan of care. Patient has provided input and agrees with goals. The nurse provided the patient and/or family with advanced directive information if needed and encouraged the patient to provide a copy to the office when available.

## 2018-03-02 LAB
ALBUMIN SERPL-MCNC: 4.5 G/DL (ref 3.5–5.5)
ALBUMIN/GLOB SERPL: 1.6 {RATIO} (ref 1.2–2.2)
ALP SERPL-CCNC: 64 IU/L (ref 39–117)
ALT SERPL-CCNC: 16 IU/L (ref 0–44)
AST SERPL-CCNC: 19 IU/L (ref 0–40)
BILIRUB SERPL-MCNC: 0.5 MG/DL (ref 0–1.2)
BUN SERPL-MCNC: 12 MG/DL (ref 6–24)
BUN/CREAT SERPL: 11 (ref 9–20)
CALCIUM SERPL-MCNC: 9.4 MG/DL (ref 8.7–10.2)
CHLORIDE SERPL-SCNC: 101 MMOL/L (ref 96–106)
CHOLEST SERPL-MCNC: 217 MG/DL (ref 100–199)
CO2 SERPL-SCNC: 25 MMOL/L (ref 18–29)
CREAT SERPL-MCNC: 1.07 MG/DL (ref 0.76–1.27)
EST. AVERAGE GLUCOSE BLD GHB EST-MCNC: 117 MG/DL
GFR SERPLBLD CREATININE-BSD FMLA CKD-EPI: 81 ML/MIN/1.73
GFR SERPLBLD CREATININE-BSD FMLA CKD-EPI: 93 ML/MIN/1.73
GLOBULIN SER CALC-MCNC: 2.8 G/DL (ref 1.5–4.5)
GLUCOSE SERPL-MCNC: 91 MG/DL (ref 65–99)
HBA1C MFR BLD: 5.7 % (ref 4.8–5.6)
HDLC SERPL-MCNC: 50 MG/DL
INTERPRETATION, 910389: NORMAL
LDLC SERPL CALC-MCNC: 134 MG/DL (ref 0–99)
POTASSIUM SERPL-SCNC: 4.5 MMOL/L (ref 3.5–5.2)
PROT SERPL-MCNC: 7.3 G/DL (ref 6–8.5)
PSA SERPL-MCNC: 0.8 NG/ML (ref 0–4)
SODIUM SERPL-SCNC: 142 MMOL/L (ref 134–144)
TRIGL SERPL-MCNC: 166 MG/DL (ref 0–149)
VLDLC SERPL CALC-MCNC: 33 MG/DL (ref 5–40)

## 2018-04-19 ENCOUNTER — OFFICE VISIT (OUTPATIENT)
Dept: INTERNAL MEDICINE CLINIC | Facility: CLINIC | Age: 53
End: 2018-04-19

## 2018-04-19 VITALS
BODY MASS INDEX: 33.94 KG/M2 | DIASTOLIC BLOOD PRESSURE: 78 MMHG | OXYGEN SATURATION: 98 % | RESPIRATION RATE: 18 BRPM | HEIGHT: 72 IN | SYSTOLIC BLOOD PRESSURE: 118 MMHG | WEIGHT: 250.6 LBS | HEART RATE: 68 BPM | TEMPERATURE: 98.2 F

## 2018-04-19 DIAGNOSIS — J40 BRONCHITIS: Primary | ICD-10-CM

## 2018-04-19 RX ORDER — BENZONATATE 200 MG/1
200 CAPSULE ORAL
Qty: 21 CAP | Refills: 0 | Status: SHIPPED | OUTPATIENT
Start: 2018-04-19 | End: 2018-04-26

## 2018-04-19 RX ORDER — AZITHROMYCIN 250 MG/1
TABLET, FILM COATED ORAL
Qty: 6 TAB | Refills: 0 | Status: SHIPPED | OUTPATIENT
Start: 2018-04-19 | End: 2018-04-24

## 2018-04-19 NOTE — PROGRESS NOTES
Moni Pandya  Identified pt with two pt identifiers(name and ). Chief Complaint   Patient presents with    Nasal Congestion     Room 2C// Mucinex DM PRN (last dose x 2 days) // denies fever     Cough     productive, clear/yellow        1. Have you been to the ER, urgent care clinic since your last visit? Hospitalized since your last visit? NO    2. Have you seen or consulted any other health care providers outside of the [address] [place] [address] since your last visit? Include any pap smears or colon screening. NO      Dr Toni Adkins notified of reason for visit, vitals and flowsheets obtained on patients. Patient received paperwork for advance directive during previous visit but has not completed at this time     Reviewed record In preparation for visit, huddled with provider and have obtained necessary documentation      Health Maintenance Due   Topic    FOBT Q 1 YEAR AGE 50-75        Wt Readings from Last 3 Encounters:   18 253 lb 6.4 oz (114.9 kg)   17 247 lb 9.6 oz (112.3 kg)   17 247 lb 6.4 oz (112.2 kg)     Temp Readings from Last 3 Encounters:   18 98 °F (36.7 °C) (Oral)   17 97.9 °F (36.6 °C) (Oral)   17 98.2 °F (36.8 °C) (Oral)     BP Readings from Last 3 Encounters:   18 132/83   17 126/80   17 120/82     Pulse Readings from Last 3 Encounters:   18 66   17 64   17 65     There were no vitals filed for this visit. Learning Assessment:  :     Learning Assessment 2014   PRIMARY LEARNER Patient   PRIMARY LANGUAGE ENGLISH   LEARNER PREFERENCE PRIMARY DEMONSTRATION   ANSWERED BY patient   RELATIONSHIP SELF       Depression Screening:  :     PHQ over the last two weeks 2017   Little interest or pleasure in doing things Not at all   Feeling down, depressed or hopeless Not at all   Total Score PHQ 2 0       Fall Risk Assessment:  :     Fall Risk Assessment, last 12 mths 2017   Able to walk?  Yes   Fall in past 15 months? No       Abuse Screening:  :     Abuse Screening Questionnaire 8/28/2017   Do you ever feel afraid of your partner? N   Are you in a relationship with someone who physically or mentally threatens you? N   Is it safe for you to go home? Y       ADL Screening:  :     ADL Assessment 8/28/2017   Feeding yourself No Help Needed   Getting from bed to chair No Help Needed   Getting dressed No Help Needed   Bathing or showering No Help Needed   Walk across the room (includes cane/walker) No Help Needed   Using the telphone No Help Needed   Taking your medications No Help Needed   Preparing meals No Help Needed   Managing money (expenses/bills) No Help Needed   Moderately strenuous housework (laundry) No Help Needed   Shopping for personal items (toiletries/medicines) No Help Needed   Shopping for groceries No Help Needed   Driving No Help Needed   Climbing a flight of stairs No Help Needed   Getting to places beyond walking distances No Help Needed             I have received verbal consent from Erin Streeter to discuss any/all medical information while they are present in the room. Medication reconciliation up to date and corrected with patient at this time.

## 2018-04-19 NOTE — PROGRESS NOTES
HPI  Mr. Tawnya Simmons is a 50y.o. year old male, he is seen today for stuffy nose (better with flonase), chest congestion with cough, sometimes productive of yellow/clear sputum, not better with mucinex DM. Symptoms x several weeks. No sob. No chest pain, sometimes tight with coughing. Also some sinus pressure. No f/c or sweats. Not getting better. Chief Complaint   Patient presents with    Nasal Congestion     Room 2C// Mucinex DM PRN (last dose x 2 days) // denies fever     Cough     productive, clear/yellow         Prior to Admission medications    Medication Sig Start Date End Date Taking? Authorizing Provider   azithromycin (ZITHROMAX) 250 mg tablet use as directed 4/19/18 4/24/18 Yes Seema Smith MD   benzonatate (TESSALON) 200 mg capsule Take 1 Cap by mouth three (3) times daily as needed for Cough for up to 7 days. 4/19/18 4/26/18 Yes Seema Smith MD   sildenafil, antihypertensive, (REVATIO) 20 mg tablet Take 1-3 Tabs by mouth daily as needed. 3/1/18  Yes Seema Smith MD   pravastatin (PRAVACHOL) 20 mg tablet TAKE 1 TAB BY MOUTH NIGHTLY. 9/15/17  Yes Seema Smith MD   fluticasone (FLONASE) 50 mcg/actuation nasal spray 2 Sprays by Both Nostrils route daily. 8/28/17  Yes Seema Smith MD   calcium carbonate (TUMS) 200 mg calcium (500 mg) Chew Take 1 Tab by mouth daily. Yes Historical Provider         Allergies   Allergen Reactions    Sulfa (Sulfonamide Antibiotics) Hives         REVIEW OF SYSTEMS:  Per HPI    PHYSICAL EXAM:  Visit Vitals    /78    Pulse 68    Temp 98.2 °F (36.8 °C) (Oral)    Resp 18    Ht 6' (1.829 m)    Wt 250 lb 9.6 oz (113.7 kg)    SpO2 98%    BMI 33.99 kg/m2     Constitutional: Appears well-developed and well-nourished. No distress. HENT:   Head: Normocephalic and atraumatic. No sinus tenderness to percussion  Eyes: No scleral icterus.    Ears: tm's wnl   Nose: nares patent, mucosa congested and erythematous  Mouth: OP clear without lesions, no pharyngeal exudate  Neck: no lad, no tm, supple   Cardiovascular: Normal S1/S2, regular rhythm. No murmurs, rubs, or gallops. Pulmonary/Chest: Effort normal and breath sounds normal. No respiratory distress. No wheezes, rhonchi, or rales. Ext: No edema. Neurological: Alert. Psychiatric: Normal mood and affect. Behavior is normal.     Lab Results   Component Value Date/Time    Sodium 142 03/01/2018 09:40 AM    Potassium 4.5 03/01/2018 09:40 AM    Chloride 101 03/01/2018 09:40 AM    CO2 25 03/01/2018 09:40 AM    Glucose 91 03/01/2018 09:40 AM    BUN 12 03/01/2018 09:40 AM    Creatinine 1.07 03/01/2018 09:40 AM    BUN/Creatinine ratio 11 03/01/2018 09:40 AM    GFR est AA 93 03/01/2018 09:40 AM    GFR est non-AA 81 03/01/2018 09:40 AM    Calcium 9.4 03/01/2018 09:40 AM    Bilirubin, total 0.5 03/01/2018 09:40 AM    AST (SGOT) 19 03/01/2018 09:40 AM    Alk. phosphatase 64 03/01/2018 09:40 AM    Protein, total 7.3 03/01/2018 09:40 AM    Albumin 4.5 03/01/2018 09:40 AM    A-G Ratio 1.6 03/01/2018 09:40 AM    ALT (SGPT) 16 03/01/2018 09:40 AM     Lab Results   Component Value Date/Time    Hemoglobin A1c 5.7 (H) 03/01/2018 09:40 AM    Hemoglobin A1c 5.7 (H) 08/28/2017 10:48 AM    Hemoglobin A1c 5.9 (H) 02/28/2017 11:34 AM      Lab Results   Component Value Date/Time    Cholesterol, total 217 (H) 03/01/2018 09:40 AM    HDL Cholesterol 50 03/01/2018 09:40 AM    LDL, calculated 134 (H) 03/01/2018 09:40 AM    VLDL, calculated 33 03/01/2018 09:40 AM    Triglyceride 166 (H) 03/01/2018 09:40 AM          ASSESSMENT/PLAN  Diagnoses and all orders for this visit:    1. Bronchitis  -     azithromycin (ZITHROMAX) 250 mg tablet; use as directed  -     benzonatate (TESSALON) 200 mg capsule; Take 1 Cap by mouth three (3) times daily as needed for Cough for up to 7 days.     given duration of symptoms treat with abx, increase fluids      Health Maintenance Due   Topic Date Due    FOBT Q 1 YEAR AGE 50-75 10/05/2017        Follow-up Disposition:  Return if symptoms worsen or fail to improve. Reviewed plan of care. Patient has provided input and agrees with goals. The nurse provided the patient and/or family with advanced directive information if needed and encouraged the patient to provide a copy to the office when available.

## 2018-04-19 NOTE — PATIENT INSTRUCTIONS
Bronchitis: Care Instructions  Your Care Instructions    Bronchitis is inflammation of the bronchial tubes, which carry air to the lungs. The tubes swell and produce mucus, or phlegm. The mucus and inflamed bronchial tubes make you cough. You may have trouble breathing. Most cases of bronchitis are caused by viruses like those that cause colds. Antibiotics usually do not help and they may be harmful. Bronchitis usually develops rapidly and lasts about 2 to 3 weeks in otherwise healthy people. Follow-up care is a key part of your treatment and safety. Be sure to make and go to all appointments, and call your doctor if you are having problems. It's also a good idea to know your test results and keep a list of the medicines you take. How can you care for yourself at home? · Take all medicines exactly as prescribed. Call your doctor if you think you are having a problem with your medicine. · Get some extra rest.  · Take an over-the-counter pain medicine, such as acetaminophen (Tylenol), ibuprofen (Advil, Motrin), or naproxen (Aleve) to reduce fever and relieve body aches. Read and follow all instructions on the label. · Do not take two or more pain medicines at the same time unless the doctor told you to. Many pain medicines have acetaminophen, which is Tylenol. Too much acetaminophen (Tylenol) can be harmful. · Take an over-the-counter cough medicine that contains dextromethorphan to help quiet a dry, hacking cough so that you can sleep. Avoid cough medicines that have more than one active ingredient. Read and follow all instructions on the label. · Breathe moist air from a humidifier, hot shower, or sink filled with hot water. The heat and moisture will thin mucus so you can cough it out. · Do not smoke. Smoking can make bronchitis worse. If you need help quitting, talk to your doctor about stop-smoking programs and medicines. These can increase your chances of quitting for good.   When should you call for help? Call 911 anytime you think you may need emergency care. For example, call if:  ? · You have severe trouble breathing. ?Call your doctor now or seek immediate medical care if:  ? · You have new or worse trouble breathing. ? · You cough up dark brown or bloody mucus (sputum). ? · You have a new or higher fever. ? · You have a new rash. ? Watch closely for changes in your health, and be sure to contact your doctor if:  ? · You cough more deeply or more often, especially if you notice more mucus or a change in the color of your mucus. ? · You are not getting better as expected. Where can you learn more? Go to http://dee dee-miko.info/. Enter H333 in the search box to learn more about \"Bronchitis: Care Instructions. \"  Current as of: May 12, 2017  Content Version: 11.4  © 5147-7[address]. Care instructions adapted under license by [employer] (which disclaims liability or warranty for this information). If you have questions about a medical condition or this instruction, always ask your healthcare professional. [address] any warranty or liability for your use of this information.

## 2018-04-19 NOTE — MR AVS SNAPSHOT
[address] [id number] [phone] Patient: Christa Brooks MRN: [id number] :1965 Visit Information Date & Time Provider Department Dept. Phone Encounter #  
 2018  8:45 AM [facility] [address] [place] [address] [id number] Follow-up Instructions Return if symptoms worsen or fail to improve. Your Appointments 2018  8:30 AM  
ROUTINE CARE with Kaila Bustillo MD  
Lawanda Harmon [address] [place] [address]) Appt Note: 6mth f/u; chol, ifg  
 [address] [facility] [address] [phone]  
  
   
 [address] Upcoming Health Maintenance Date Due FOBT Q 1 YEAR AGE 50-75 10/5/2017 DTaP/Tdap/Td series (2 - Td) 3/30/2021 Allergies as of 2018  Review Complete On: 2018 By: Kaila Bustillo MD  
  
 Severity Noted Reaction Type Reactions Sulfa (Sulfonamide Antibiotics)  2011    Hives Current Immunizations  Reviewed on 3/18/2015 Name Date TDAP Vaccine 3/30/2011 Not reviewed this visit You Were Diagnosed With   
  
 Codes Comments Bronchitis    -  Primary ICD-10-CM: P50 ICD-9-CM: 323 Vitals BP Pulse Temp Resp Height(growth percentile) Weight(growth percentile) 118/78 68 98.2 °F (36.8 °C) (Oral) 18 6' (1.829 m) 250 lb 9.6 oz (113.7 kg) SpO2 BMI Smoking Status 98% 33.99 kg/m2 Never Smoker Vitals History BMI and BSA Data Body Mass Index Body Surface Area  
 33.99 kg/m 2 2.4 m 2 Preferred Pharmacy Pharmacy Name Phone [phone] [facility] [phone] Your Updated Medication List  
  
   
This list is accurate as of 18  9:34 AM.  Always use your most recent med list.  
  
  
  
  
 azithromycin 250 mg tablet Commonly known as:  ZITHROMAX  
use as directed  
  
 benzonatate 200 mg capsule Commonly known as:  TESSALON Take 1 Cap by mouth three (3) times daily as needed for Cough for up to 7 days. calcium carbonate 200 mg calcium (500 mg) Chew Commonly known as:  TUMS Take 1 Tab by mouth daily. fluticasone 50 mcg/actuation nasal spray Commonly known as:  Genie Uriel 2 Sprays by Both Nostrils route daily. pravastatin 20 mg tablet Commonly known as:  PRAVACHOL  
TAKE 1 TAB BY MOUTH NIGHTLY.  
  
 sildenafil (antihypertensive) 20 mg tablet Commonly known as:  REVATIO Take 1-3 Tabs by mouth daily as needed. Prescriptions Sent to Pharmacy Refills  
 azithromycin (ZITHROMAX) 250 mg tablet 0 Sig: use as directed Class: Normal  
 Pharmacy: [facility] Ph #: [phone]  
 benzonatate (TESSALON) 200 mg capsule 0 Sig: Take 1 Cap by mouth three (3) times daily as needed for Cough for up to 7 days. Class: Normal  
 Pharmacy: [address] Ph #: [phone] Route: Oral  
  
Follow-up Instructions Return if symptoms worsen or fail to improve. Patient Instructions Bronchitis: Care Instructions Your Care Instructions Bronchitis is inflammation of the bronchial tubes, which carry air to the lungs. The tubes swell and produce mucus, or phlegm. The mucus and inflamed bronchial tubes make you cough. You may have trouble breathing. Most cases of bronchitis are caused by viruses like those that cause colds. Antibiotics usually do not help and they may be harmful. Bronchitis usually develops rapidly and lasts about 2 to 3 weeks in otherwise healthy people. Follow-up care is a key part of your treatment and safety.  Be sure to make and go to all appointments, and call your doctor if you are having problems. It's also a good idea to know your test results and keep a list of the medicines you take. How can you care for yourself at home? · Take all medicines exactly as prescribed. Call your doctor if you think you are having a problem with your medicine. · Get some extra rest. 
· Take an over-the-counter pain medicine, such as acetaminophen (Tylenol), ibuprofen (Advil, Motrin), or naproxen (Aleve) to reduce fever and relieve body aches. Read and follow all instructions on the label. · Do not take two or more pain medicines at the same time unless the doctor told you to. Many pain medicines have acetaminophen, which is Tylenol. Too much acetaminophen (Tylenol) can be harmful. · Take an over-the-counter cough medicine that contains dextromethorphan to help quiet a dry, hacking cough so that you can sleep. Avoid cough medicines that have more than one active ingredient. Read and follow all instructions on the label. · Breathe moist air from a humidifier, hot shower, or sink filled with hot water. The heat and moisture will thin mucus so you can cough it out. · Do not smoke. Smoking can make bronchitis worse. If you need help quitting, talk to your doctor about stop-smoking programs and medicines. These can increase your chances of quitting for good. When should you call for help? Call 911 anytime you think you may need emergency care. For example, call if: 
? · You have severe trouble breathing. ?Call your doctor now or seek immediate medical care if: 
? · You have new or worse trouble breathing. ? · You cough up dark brown or bloody mucus (sputum). ? · You have a new or higher fever. ? · You have a new rash. ? Watch closely for changes in your health, and be sure to contact your doctor if: 
? · You cough more deeply or more often, especially if you notice more mucus or a change in the color of your mucus. ? · You are not getting better as expected. Where can you learn more? Go to http://dee dee-miko.info/. Enter H333 in the search box to learn more about \"Bronchitis: Care Instructions. \" Current as of: May 12, 2017 Content Version: 11.4 © 5334-0491 [employer]. Care instructions adapted under license by [employer] (which disclaims liability or warranty for this information). If you have questions about a medical condition or this instruction, always ask your healthcare professional. [place] [address] any warranty or liability for your use of this information. Introducing [address]! Isaiah Lorenzo introduces [employer] patient portal. Now you can access parts of your medical record, email your doctor's office, and request medication refills online. 1. In your internet browser, go to [email or web address] 2. Click on the First Time User? Click Here link in the Sign In box. You will see the New Member Sign Up page. 3. Enter your [employer] Access Code exactly as it appears below. You will not need to use this code after youve completed the sign-up process. If you do not sign up before the expiration date, you must request a new code. · [employer] Access Code: [email or web address] Expires: 5/30/2018 10:26 AM 
 
4. Enter the last four digits of your Social Security Number (xxxx) and Date of Birth (mm/dd/yyyy) as indicated and click Submit. You will be taken to the next sign-up page. 5. Create a [employer]do ID. This will be your [employer] login ID and cannot be changed, so think of one that is secure and easy to remember. 6. Create a [employer] password. You can change your password at any time. 7. Enter your Password Reset Question and Answer. This can be used at a later time if you forget your password. 8. Enter your e-mail address. You will receive e-mail notification when new information is available in [address] 9. Click Sign Up. You can now view and download portions of your medical record. 10. Click the Download Summary menu link to download a portable copy of your medical information. If you have questions, please visit the Frequently Asked Questions section of the [employer] website. Remember, [employer] is NOT to be used for urgent needs. For medical emergencies, dial 911. Now available from your iPhone and Android! Please provide this summary of care documentation to your next provider. Your primary care clinician is listed as Jacqueline Zamora. If you have any questions after today's visit, please call [phone].

## 2018-07-02 RX ORDER — FLUTICASONE PROPIONATE 50 MCG
2 SPRAY, SUSPENSION (ML) NASAL DAILY
Qty: 1 BOTTLE | Refills: 2 | Status: SHIPPED | OUTPATIENT
Start: 2018-07-02 | End: 2020-03-12 | Stop reason: SDUPTHER

## 2018-09-04 ENCOUNTER — OFFICE VISIT (OUTPATIENT)
Dept: INTERNAL MEDICINE CLINIC | Facility: CLINIC | Age: 53
End: 2018-09-04

## 2018-09-04 VITALS
DIASTOLIC BLOOD PRESSURE: 74 MMHG | RESPIRATION RATE: 16 BRPM | HEART RATE: 59 BPM | TEMPERATURE: 98 F | OXYGEN SATURATION: 96 % | HEIGHT: 72 IN | WEIGHT: 254.2 LBS | SYSTOLIC BLOOD PRESSURE: 118 MMHG | BODY MASS INDEX: 34.43 KG/M2

## 2018-09-04 DIAGNOSIS — R73.01 IFG (IMPAIRED FASTING GLUCOSE): ICD-10-CM

## 2018-09-04 DIAGNOSIS — M25.572 ACUTE LEFT ANKLE PAIN: ICD-10-CM

## 2018-09-04 DIAGNOSIS — Z12.11 COLON CANCER SCREENING: ICD-10-CM

## 2018-09-04 DIAGNOSIS — E78.00 HIGH CHOLESTEROL: Primary | ICD-10-CM

## 2018-09-04 RX ORDER — DICLOFENAC SODIUM 10 MG/G
4 GEL TOPICAL 4 TIMES DAILY
Qty: 100 G | Refills: 4 | Status: SHIPPED | OUTPATIENT
Start: 2018-09-04 | End: 2022-05-12 | Stop reason: SDUPTHER

## 2018-09-04 NOTE — MR AVS SNAPSHOT
[address] [address] [phone] Patient: Eloy Mejias MRN: [id number] :1965 Visit Information Date & Time Provider Department Dept. Phone Encounter #  
 2018  2:30 PM Mae Sepulveda MD [facility] [place] [phone] [id number] Follow-up Instructions Return in about 6 months (around 3/4/2019) for chol, ifg. Upcoming Health Maintenance Date Due FOBT Q 1 YEAR AGE 50-75 10/5/2017 Influenza Age 5 to Adult 2018 DTaP/Tdap/Td series (2 - Td) 3/30/2021 Allergies as of 2018  Review Complete On: 2018 By: Mae Sepulveda MD  
  
 Severity Noted Reaction Type Reactions Sulfa (Sulfonamide Antibiotics)  2011    Hives Current Immunizations  Reviewed on 3/18/2015 Name Date TDAP Vaccine 3/30/2011 Not reviewed this visit You Were Diagnosed With   
  
 Codes Comments High cholesterol    -  Primary ICD-10-CM: E78.00 ICD-9-CM: 272.0 IFG (impaired fasting glucose)     ICD-10-CM: R73.01 
ICD-9-CM: 790.21 Acute left ankle pain     ICD-10-CM: M25.572 ICD-9-CM: 719.47 Colon cancer screening     ICD-10-CM: Z12.11 ICD-9-CM: V76.51 BMI 34.0-34.9,adult     ICD-10-CM: H09.42 
ICD-9-CM: V85.34 Vitals BP Pulse Temp Resp Height(growth percentile) Weight(growth percentile) 118/74 (BP 1 Location: Left arm, BP Patient Position: Sitting) (!) 59 98 °F (36.7 °C) (Oral) 16 6' (1.829 m) 254 lb 3.2 oz (115.3 kg) SpO2 BMI Smoking Status 96% 34.48 kg/m2 Never Smoker Vitals History BMI and BSA Data Body Mass Index Body Surface Area 34.48 kg/m 2 2.42 m 2 Preferred Pharmacy Pharmacy Name Phone [id number] [facility] [phone] Your Updated Medication List  
  
   
 This list is accurate as of 9/4/18  2:53 PM.  Always use your most recent med list.  
  
  
  
  
 calcium carbonate 200 mg calcium (500 mg) Eden Presser Commonly known as:  TUMS Take 1 Tab by mouth daily. diclofenac 1 % Gel Commonly known as:  VOLTAREN Apply 4 g to affected area four (4) times daily. fluticasone 50 mcg/actuation nasal spray Commonly known as:  Montine [place] 2 Sprays by Both Nostrils route daily. pravastatin 20 mg tablet Commonly known as:  PRAVACHOL  
TAKE 1 TAB BY MOUTH NIGHTLY.  
  
 sildenafil (antihypertensive) 20 mg tablet Commonly known as:  REVATIO Take 1-3 Tabs by mouth daily as needed. Prescriptions Sent to Pharmacy Refills  
 diclofenac (VOLTAREN) 1 % gel 4 Sig: Apply 4 g to affected area four (4) times daily. Class: Normal  
 Pharmacy: [address] #: [phone] Route: Topical  
  
We Performed the Following HEMOGLOBIN A1C WITH EAG [39351 CPT(R)] LIPID PANEL [25849 CPT(R)] METABOLIC PANEL, COMPREHENSIVE [57098 CPT(R)] REFERRAL TO GASTROENTEROLOGY [GHJ80 Custom] Follow-up Instructions Return in about 6 months (around 3/4/2019) for chol, ifg.  
  
  
Referral Information Referral ID Referred By Referred To  
  
 [id number] [facility] [address] [facility] [address], [address] [facility] Visits Status Start Date End Date 1 New Request 9/4/18 9/4/19 If your referral has a status of pending review or denied, additional information will be sent to support the outcome of this decision. Introducing [facility]! М[employer] Nation introduces [employer] patient portal. Now you can access parts of your medical record, email your doctor's office, and request medication refills online. 1. In your internet browser, go to [email or web address] 2. Click on the First Time User? Click Here link in the Sign In box. You will see the New Member Sign Up page. 3. Enter your Navita Access Code exactly as it appears below. You will not need to use this code after youve completed the sign-up process. If you do not sign up before the expiration date, you must request a new code. · Navita Access Code: [email or web address] Expires: 12/3/2018  2:52 PM 
 
4. Enter the last four digits of your Social Security Number (xxxx) and Date of Birth (mm/dd/yyyy) as indicated and click Submit. You will be taken to the next sign-up page. 5. Create a Navita ID. This will be your Navita login ID and cannot be changed, so think of one that is secure and easy to remember. 6. Create a Navita password. You can change your password at any time. 7. Enter your Password Reset Question and Answer. This can be used at a later time if you forget your password. 8. Enter your e-mail address. You will receive e-mail notification when new information is available in [address] 9. Click Sign Up. You can now view and download portions of your medical record. 10. Click the Download Summary menu link to download a portable copy of your medical information. If you have questions, please visit the Frequently Asked Questions section of the Navita website. Remember, Navita is NOT to be used for urgent needs. For medical emergencies, dial 911. Now available from your iPhone and Android! Please provide this summary of care documentation to your next provider. Your primary care clinician is listed as Jacqueline Zamora. If you have any questions after today's visit, please call [phone].

## 2018-09-04 NOTE — PROGRESS NOTES
HPI Mr. Antohny Leonard is a 50y.o. year old male, he is seen today for follow up high cholesterol, IFG. Having difficulty losing weight - has cut back on beer - now may drink only on weekends, but just cut back. Also drinking more water. No chest pain, sob, dizziness, weakness. Has missed some doses of pravastatin - at least 3-4 days out the week. Forgets to take. No mylagias. Hasn't gotten colonoscopy yet, will schedule. No change in bowels, melena or brbpr. Has intermittent ankle pain since sprain few years ago, better with voltaren gel, rare use. Asking for refill. Chief Complaint Patient presents with  Cholesterol Problem Room 2C// Fasting  Blood sugar problem  Ankle Pain L ankle pain// diclofenac gel worked well in the past   
  
 
Prior to Admission medications Medication Sig Start Date End Date Taking? Authorizing Provider  
diclofenac (VOLTAREN) 1 % gel Apply 4 g to affected area four (4) times daily. 9/4/18  Yes Jackeline Canada MD  
fluticasone (FLONASE) 50 mcg/actuation nasal spray 2 Sprays by Both Nostrils route daily. 7/2/18  Yes Jackeline Canada MD  
sildenafil, antihypertensive, (REVATIO) 20 mg tablet Take 1-3 Tabs by mouth daily as needed. 3/1/18  Yes Jackeline Canada MD  
pravastatin (PRAVACHOL) 20 mg tablet TAKE 1 TAB BY MOUTH NIGHTLY. 9/15/17  Yes Jackeline Canada MD  
calcium carbonate (TUMS) 200 mg calcium (500 mg) Chew Take 1 Tab by mouth daily. Yes Historical Provider Allergies Allergen Reactions  Sulfa (Sulfonamide Antibiotics) Hives REVIEW OF SYSTEMS: 
Per HPI PHYSICAL EXAM: 
Visit Vitals  /74 (BP 1 Location: Left arm, BP Patient Position: Sitting)  Pulse (!) 59  Temp 98 °F (36.7 °C) (Oral)  Resp 16  
 Ht 6' (1.829 m)  Wt 254 lb 3.2 oz (115.3 kg)  SpO2 96%  BMI 34.48 kg/m2 Constitutional: Appears well-developed and well-nourished. No distress. HENT:  
Head: Normocephalic and atraumatic. Eyes: No scleral icterus. Cardiovascular: Normal S1/S2, regular rhythm. No murmurs, rubs, or gallops. Pulmonary/Chest: Effort normal and breath sounds normal. No respiratory distress. No wheezes, rhonchi, or rales. Ext: No edema. Left ankle: no pain on palpation, FROM Neurological: Alert. Psychiatric: Normal mood and affect. Behavior is normal. 
  
Lab Results Component Value Date/Time Sodium 142 03/01/2018 09:40 AM  
 Potassium 4.5 03/01/2018 09:40 AM  
 Chloride 101 03/01/2018 09:40 AM  
 CO2 25 03/01/2018 09:40 AM  
 Glucose 91 03/01/2018 09:40 AM  
 BUN 12 03/01/2018 09:40 AM  
 Creatinine 1.07 03/01/2018 09:40 AM  
 BUN/Creatinine ratio 11 03/01/2018 09:40 AM  
 GFR est AA 93 03/01/2018 09:40 AM  
 GFR est non-AA 81 03/01/2018 09:40 AM  
 Calcium 9.4 03/01/2018 09:40 AM  
 Bilirubin, total 0.5 03/01/2018 09:40 AM  
 AST (SGOT) 19 03/01/2018 09:40 AM  
 Alk. phosphatase 64 03/01/2018 09:40 AM  
 Protein, total 7.3 03/01/2018 09:40 AM  
 Albumin 4.5 03/01/2018 09:40 AM  
 A-G Ratio 1.6 03/01/2018 09:40 AM  
 ALT (SGPT) 16 03/01/2018 09:40 AM  
 
Lab Results Component Value Date/Time Hemoglobin A1c 5.7 (H) 03/01/2018 09:40 AM  
 Hemoglobin A1c 5.7 (H) 08/28/2017 10:48 AM  
 Hemoglobin A1c 5.9 (H) 02/28/2017 11:34 AM  
  
Lab Results Component Value Date/Time Cholesterol, total 217 (H) 03/01/2018 09:40 AM  
 HDL Cholesterol 50 03/01/2018 09:40 AM  
 LDL, calculated 134 (H) 03/01/2018 09:40 AM  
 VLDL, calculated 33 03/01/2018 09:40 AM  
 Triglyceride 166 (H) 03/01/2018 09:40 AM  
  
 
 
ASSESSMENT/PLAN Diagnoses and all orders for this visit: 
 
1. High cholesterol -     LIPID PANEL Has missed some doses of pravastatin frequently, if cholesterol high consider lipitor 2. IFG (impaired fasting glucose) -     METABOLIC PANEL, COMPREHENSIVE 
-     HEMOGLOBIN A1C WITH EAG Check labs, decrease carbs 3. Acute left ankle pain -     diclofenac (VOLTAREN) 1 % gel; Apply 4 g to affected area four (4) times daily. 4. Colon cancer screening 
-     [facility] 5. BMI 34.0-34.9,adult Cut back on calories as he is Health Maintenance Due Topic Date Due  
 FOBT Q 1 YEAR AGE 50-75  10/05/2017  Influenza Age 5 to Adult  08/01/2018 Follow-up Disposition: 
Return in about 6 months (around 3/4/2019) for chol, ifg.  
 
 
Reviewed plan of care. Patient has provided input and agrees with goals. The nurse provided the patient and/or family with advanced directive information if needed and encouraged the patient to provide a copy to the office when available.

## 2018-09-04 NOTE — PROGRESS NOTES
Ramón Pina  Identified pt with two pt identifiers(name and ). Chief Complaint Patient presents with  Cholesterol Problem Room 2C//  Blood sugar problem 1. Have you been to the ER, urgent care clinic since your last visit? Hospitalized since your last visit? NO 
 
2. Have you seen or consulted any other health care providers outside of the [address] [place] [address] since your last visit? Include any pap smears or colon screening. NO Provider notified of reason for visit, vitals and flowsheets obtained on patients. Patient received paperwork for advance directive during previous visit but has not completed at this time Reviewed record In preparation for visit, huddled with provider and have obtained necessary documentation Health Maintenance Due Topic  FOBT Q 1 YEAR AGE 50-75  Influenza Age 5 to Adult Wt Readings from Last 3 Encounters:  
18 250 lb 9.6 oz (113.7 kg) 18 253 lb 6.4 oz (114.9 kg) 17 247 lb 9.6 oz (112.3 kg) Temp Readings from Last 3 Encounters:  
18 98.2 °F (36.8 °C) (Oral) 18 98 °F (36.7 °C) (Oral) 17 97.9 °F (36.6 °C) (Oral) BP Readings from Last 3 Encounters:  
18 118/78  
18 132/83  
17 126/80 Pulse Readings from Last 3 Encounters:  
18 68  
18 66  
17 64 There were no vitals filed for this visit. Learning Assessment: 
:  
 
Learning Assessment 2014 PRIMARY LEARNER Patient PRIMARY LANGUAGE ENGLISH  
LEARNER PREFERENCE PRIMARY DEMONSTRATION  
ANSWERED BY patient RELATIONSHIP SELF Depression Screening: 
:  
 
PHQ over the last two weeks 2017 Little interest or pleasure in doing things Not at all Feeling down, depressed, irritable, or hopeless Not at all Total Score PHQ 2 0 Fall Risk Assessment: 
:  
 
Fall Risk Assessment, last 12 mths 2017 Able to walk? Yes Fall in past 12 months?  No  
 
 
 Abuse Screening: 
:  
 
Abuse Screening Questionnaire 8/28/2017 Do you ever feel afraid of your partner? Marbyeth Goss Are you in a relationship with someone who physically or mentally threatens you? Marybeth Goss Is it safe for you to go home? Y  
 
 
ADL Screening: 
:  
 
ADL Assessment 8/28/2017 Feeding yourself No Help Needed Getting from bed to chair No Help Needed Getting dressed No Help Needed Bathing or showering No Help Needed Walk across the room (includes cane/walker) No Help Needed Using the telphone No Help Needed Taking your medications No Help Needed Preparing meals No Help Needed Managing money (expenses/bills) No Help Needed Moderately strenuous housework (laundry) No Help Needed Shopping for personal items (toiletries/medicines) No Help Needed Shopping for groceries No Help Needed Driving No Help Needed Climbing a flight of stairs No Help Needed Getting to places beyond walking distances No Help Needed Medication reconciliation up to date and corrected with patient at this time.

## 2018-09-05 LAB
ALBUMIN SERPL-MCNC: 4.5 G/DL (ref 3.5–5.5)
ALBUMIN/GLOB SERPL: 1.7 {RATIO} (ref 1.2–2.2)
ALP SERPL-CCNC: 62 IU/L (ref 39–117)
ALT SERPL-CCNC: 22 IU/L (ref 0–44)
AST SERPL-CCNC: 23 IU/L (ref 0–40)
BILIRUB SERPL-MCNC: 0.7 MG/DL (ref 0–1.2)
BUN SERPL-MCNC: 15 MG/DL (ref 6–24)
BUN/CREAT SERPL: 14 (ref 9–20)
CALCIUM SERPL-MCNC: 9.2 MG/DL (ref 8.7–10.2)
CHLORIDE SERPL-SCNC: 100 MMOL/L (ref 96–106)
CHOLEST SERPL-MCNC: 258 MG/DL (ref 100–199)
CO2 SERPL-SCNC: 22 MMOL/L (ref 20–29)
CREAT SERPL-MCNC: 1.07 MG/DL (ref 0.76–1.27)
EST. AVERAGE GLUCOSE BLD GHB EST-MCNC: 120 MG/DL
GLOBULIN SER CALC-MCNC: 2.6 G/DL (ref 1.5–4.5)
GLUCOSE SERPL-MCNC: 88 MG/DL (ref 65–99)
HBA1C MFR BLD: 5.8 % (ref 4.8–5.6)
HDLC SERPL-MCNC: 49 MG/DL
LDLC SERPL CALC-MCNC: 163 MG/DL (ref 0–99)
POTASSIUM SERPL-SCNC: 4.2 MMOL/L (ref 3.5–5.2)
PROT SERPL-MCNC: 7.1 G/DL (ref 6–8.5)
SODIUM SERPL-SCNC: 140 MMOL/L (ref 134–144)
TRIGL SERPL-MCNC: 232 MG/DL (ref 0–149)
VLDLC SERPL CALC-MCNC: 46 MG/DL (ref 5–40)

## 2018-09-05 RX ORDER — ATORVASTATIN CALCIUM 20 MG/1
20 TABLET, FILM COATED ORAL DAILY
Qty: 90 TAB | Refills: 2 | Status: SHIPPED | OUTPATIENT
Start: 2018-09-05 | End: 2019-07-24 | Stop reason: SDUPTHER

## 2018-09-05 NOTE — PROGRESS NOTES
Tell him to take lipitor 20mg daily instead of pravastatin as cholesterol is higher - a1c stable in prediabetes range

## 2018-11-12 ENCOUNTER — TELEPHONE (OUTPATIENT)
Dept: INTERNAL MEDICINE CLINIC | Facility: CLINIC | Age: 53
End: 2018-11-12

## 2018-11-12 NOTE — TELEPHONE ENCOUNTER
Pt advised to call GI to see if they can rx alternative as pharmacy noted interaction. Advise pt to make sure GI has his Sulfa allergy on file.

## 2018-11-12 NOTE — TELEPHONE ENCOUNTER
Pt called to speak with the nurse in regards to a medication he has to take before his colonoscopy tomorrow. Pt stated it may have sulfur in it.

## 2019-03-04 ENCOUNTER — OFFICE VISIT (OUTPATIENT)
Dept: INTERNAL MEDICINE CLINIC | Facility: CLINIC | Age: 54
End: 2019-03-04

## 2019-03-04 VITALS
WEIGHT: 258.2 LBS | OXYGEN SATURATION: 96 % | TEMPERATURE: 97.9 F | BODY MASS INDEX: 34.97 KG/M2 | RESPIRATION RATE: 16 BRPM | HEART RATE: 67 BPM | SYSTOLIC BLOOD PRESSURE: 116 MMHG | HEIGHT: 72 IN | DIASTOLIC BLOOD PRESSURE: 80 MMHG

## 2019-03-04 DIAGNOSIS — E66.01 SEVERE OBESITY (HCC): ICD-10-CM

## 2019-03-04 DIAGNOSIS — R73.03 PREDIABETES: Primary | ICD-10-CM

## 2019-03-04 DIAGNOSIS — E78.00 HIGH CHOLESTEROL: ICD-10-CM

## 2019-03-04 DIAGNOSIS — Z12.5 PROSTATE CANCER SCREENING: ICD-10-CM

## 2019-03-04 NOTE — PROGRESS NOTES
HPI Mr. Tawnya Simmons is a 48y.o. year old male, he is seen today for follow up high cholesterol, IFG, weight. Had colonoscopy which was normal per patient. No chest pain, sob, dizziness, weakness, edema. No HA or vision changes. Will have intermittent left great toe pain at joint - no heat, redness or swelling. Asking if he can use voltaren gel in area and I agreed. Has been getting some exercise outside of work, does walk at work all day. Normally 6-8K steps per day. Has gained 8# in about a year. Doesn't eat out much. Trying to eat more grilled foods - adding more fish. Not that much bread or pasta in diet. No increased urinary frequency, urgency or difficulty emptying bladder. Maybe a little more muscle aches with lipitor - tolerable. Chief Complaint Patient presents with  Cholesterol Problem Room 2A// colonoscopy was done 2019. bazzi // fasting  Blood sugar problem Prior to Admission medications Medication Sig Start Date End Date Taking? Authorizing Provider  
atorvastatin (LIPITOR) 20 mg tablet Take 1 Tab by mouth daily. 9/5/18  Yes Rebecca Castro MD  
diclofenac (VOLTAREN) 1 % gel Apply 4 g to affected area four (4) times daily. 9/4/18  Yes Rebecca Castro MD  
fluticasone (FLONASE) 50 mcg/actuation nasal spray 2 Sprays by Both Nostrils route daily. 7/2/18  Yes Rebecca Castro MD  
sildenafil, antihypertensive, (REVATIO) 20 mg tablet Take 1-3 Tabs by mouth daily as needed. 3/1/18  Yes Rebecca Castro MD  
calcium carbonate (TUMS) 200 mg calcium (500 mg) Chew Take 1 Tab by mouth daily. Yes Provider, Historical  
 
 
 
Allergies Allergen Reactions  Sulfa (Sulfonamide Antibiotics) Hives REVIEW OF SYSTEMS: 
Per HPI PHYSICAL EXAM: 
Visit Vitals /80 Pulse 67 Temp 97.9 °F (36.6 °C) (Oral) Resp 16 Ht 6' (1.829 m) Wt 258 lb 3.2 oz (117.1 kg) SpO2 96% BMI 35.02 kg/m² Constitutional: Appears well-developed and well-nourished. No distress. HENT:  
Head: Normocephalic and atraumatic. Eyes: No scleral icterus. Neck: no lad, no tm, supple Cardiovascular: Normal S1/S2, regular rhythm. No murmurs, rubs, or gallops. Pulmonary/Chest: Effort normal and breath sounds normal. No respiratory distress. No wheezes, rhonchi, or rales. Ext: No edema. Neurological: Alert. Psychiatric: Normal mood and affect. Behavior is normal. 
  
Lab Results Component Value Date/Time Sodium 140 09/04/2018 02:58 PM  
 Potassium 4.2 09/04/2018 02:58 PM  
 Chloride 100 09/04/2018 02:58 PM  
 CO2 22 09/04/2018 02:58 PM  
 Glucose 88 09/04/2018 02:58 PM  
 BUN 15 09/04/2018 02:58 PM  
 Creatinine 1.07 09/04/2018 02:58 PM  
 BUN/Creatinine ratio 14 09/04/2018 02:58 PM  
 GFR est AA 93 09/04/2018 02:58 PM  
 GFR est non-AA 81 09/04/2018 02:58 PM  
 Calcium 9.2 09/04/2018 02:58 PM  
 Bilirubin, total 0.7 09/04/2018 02:58 PM  
 AST (SGOT) 23 09/04/2018 02:58 PM  
 Alk. phosphatase 62 09/04/2018 02:58 PM  
 Protein, total 7.1 09/04/2018 02:58 PM  
 Albumin 4.5 09/04/2018 02:58 PM  
 A-G Ratio 1.7 09/04/2018 02:58 PM  
 ALT (SGPT) 22 09/04/2018 02:58 PM  
 
Lab Results Component Value Date/Time Hemoglobin A1c 5.8 (H) 09/04/2018 02:58 PM  
 Hemoglobin A1c 5.7 (H) 03/01/2018 09:40 AM  
 Hemoglobin A1c 5.7 (H) 08/28/2017 10:48 AM  
  
Lab Results Component Value Date/Time Cholesterol, total 258 (H) 09/04/2018 02:58 PM  
 HDL Cholesterol 49 09/04/2018 02:58 PM  
 LDL, calculated 163 (H) 09/04/2018 02:58 PM  
 VLDL, calculated 46 (H) 09/04/2018 02:58 PM  
 Triglyceride 232 (H) 09/04/2018 02:58 PM  
  
 
 
ASSESSMENT/PLAN Diagnoses and all orders for this visit: 1. Prediabetes 
-     HEMOGLOBIN A1C WITH EAG 2. High cholesterol -     METABOLIC PANEL, COMPREHENSIVE 
-     LIPID PANEL On lipitor - check labs 3. Prostate cancer screening -     PSA, DIAGNOSTIC (PROSTATE SPECIFIC AG) 4. Severe obesity ([facility] [address] Advised increased exercise such as walking more quickly, continue lower calorie diet There are no preventive care reminders to display for this patient. Follow-up Disposition: 
Return in about 6 months (around 9/4/2019) for chol, ifg.  
 
 
Reviewed plan of care. Patient has provided input and agrees with goals. The nurse provided the patient and/or family with advanced directive information if needed and encouraged the patient to provide a copy to the office when available.

## 2019-03-04 NOTE — PATIENT INSTRUCTIONS
A Healthy Lifestyle: Care Instructions Your Care Instructions A healthy lifestyle can help you feel good, stay at a healthy weight, and have plenty of energy for both work and play. A healthy lifestyle is something you can share with your whole family. A healthy lifestyle also can lower your risk for serious health problems, such as high blood pressure, heart disease, and diabetes. You can follow a few steps listed below to improve your health and the health of your family. Follow-up care is a key part of your treatment and safety. Be sure to make and go to all appointments, and call your doctor if you are having problems. It's also a good idea to know your test results and keep a list of the medicines you take. How can you care for yourself at home? · Do not eat too much sugar, fat, or fast foods. You can still have dessert and treats now and then. The goal is moderation. · Start small to improve your eating habits. Pay attention to portion sizes, drink less juice and soda pop, and eat more fruits and vegetables. ? Eat a healthy amount of food. A 3-ounce serving of meat, for example, is about the size of a deck of cards. Fill the rest of your plate with vegetables and whole grains. ? Limit the amount of soda and sports drinks you have every day. Drink more water when you are thirsty. ? Eat at least 5 servings of fruits and vegetables every day. It may seem like a lot, but it is not hard to reach this goal. A serving or helping is 1 piece of fruit, 1 cup of vegetables, or 2 cups of leafy, raw vegetables. Have an apple or some carrot sticks as an afternoon snack instead of a candy bar. Try to have fruits and/or vegetables at every meal. 
· Make exercise part of your daily routine. You may want to start with simple activities, such as walking, bicycling, or slow swimming. Try to be active 30 to 60 minutes every day.  You do not need to do all 30 to 60 minutes all at once. For example, you can exercise 3 times a day for 10 or 20 minutes. Moderate exercise is safe for most people, but it is always a good idea to talk to your doctor before starting an exercise program. 
· Keep moving. [employer] the lawn, work in the garden, or [employer]. Take the stairs instead of the elevator at work. · If you smoke, quit. People who smoke have an increased risk for heart attack, stroke, cancer, and other lung illnesses. Quitting is hard, but there are ways to boost your chance of quitting tobacco for good. ? Use nicotine gum, patches, or lozenges. ? Ask your doctor about stop-smoking programs and medicines. ? Keep trying. In addition to reducing your risk of diseases in the future, you will notice some benefits soon after you stop using tobacco. If you have shortness of breath or asthma symptoms, they will likely get better within a few weeks after you quit. · Limit how much alcohol you drink. Moderate amounts of alcohol (up to 2 drinks a day for men, 1 drink a day for women) are okay. But drinking too much can lead to liver problems, high blood pressure, and other health problems. Family health If you have a family, there are many things you can do together to improve your health. · Eat meals together as a family as often as possible. · Eat healthy foods. This includes fruits, vegetables, lean meats and dairy, and whole grains. · Include your family in your fitness plan. Most people think of activities such as jogging or tennis as the way to fitness, but there are many ways you and your family can be more active. Anything that makes you breathe hard and gets your heart pumping is exercise. Here are some tips: 
? Walk to do errands or to take your child to school or the bus. 
? Go for a family bike ride after dinner instead of watching TV. Where can you learn more? Go to http://dee dee-miko.info/. Enter Y285 in the search box to learn more about \"A Healthy Lifestyle: Care Instructions. \" Current as of: September 11, 2018 Content Version: 11.9 © 5613-3842 [employer]. Care instructions adapted under license by [employer] (which disclaims liability or warranty for this information). If you have questions about a medical condition or this instruction, always ask your healthcare professional. [address] [address] any warranty or liability for your use of this information.

## 2019-03-04 NOTE — PROGRESS NOTES
[facility]  Identified pt with two pt identifiers(name and ). Chief Complaint Patient presents with  Cholesterol Problem Room 2A//  Blood sugar problem 1. Have you been to the ER, urgent care clinic since your last visit? Hospitalized since your last visit? NO 
 
2. Have you seen or consulted any other health care providers outside of the [address] [place] [address] since your last visit? Include any pap smears or colon screening. NO Provider notified of reason for visit, vitals and flowsheets obtained on patients. Patient received paperwork for advance directive during previous visit but has not completed at this time Reviewed record In preparation for visit, huddled with provider and have obtained necessary documentation Health Maintenance Due Topic  FOBT Q 1 YEAR AGE 50-75  Influenza Age 5 to Adult Wt Readings from Last 3 Encounters:  
19 258 lb 3.2 oz (117.1 kg) 18 254 lb 3.2 oz (115.3 kg) 18 250 lb 9.6 oz (113.7 kg) Temp Readings from Last 3 Encounters:  
18 98 °F (36.7 °C) (Oral) 18 98.2 °F (36.8 °C) (Oral) 18 98 °F (36.7 °C) (Oral) BP Readings from Last 3 Encounters:  
18 118/74  
18 118/78  
18 132/83 Pulse Readings from Last 3 Encounters:  
18 (!) 59  
18 68  
18 66 Vitals:  
 19 7440 Weight: 258 lb 3.2 oz (117.1 kg) Height: 6' (1.829 m) Learning Assessment: 
:  
 
Learning Assessment 2014 PRIMARY LEARNER Patient PRIMARY LANGUAGE ENGLISH  
LEARNER PREFERENCE PRIMARY DEMONSTRATION  
ANSWERED BY patient RELATIONSHIP SELF Depression Screening: 
:  
 
3 most recent PHQ Screens 3/4/2019 Little interest or pleasure in doing things Not at all Feeling down, depressed, irritable, or hopeless Not at all Total Score PHQ 2 0 Fall Risk Assessment: 
:  
 
Fall Risk Assessment, last 12 mths 2017 Able to walk?  Yes  
 Fall in past 12 months? No  
 
 
Abuse Screening: 
:  
 
Abuse Screening Questionnaire 8/28/2017 Do you ever feel afraid of your partner? [place] Are you in a relationship with someone who physically or mentally threatens you? [place] [employer] Is it safe for you to go home? Y  
 
 
ADL Screening: 
:  
 
ADL Assessment 8/28/2017 Feeding yourself No Help Needed Getting from bed to chair No Help Needed Getting dressed No Help Needed Bathing or showering No Help Needed Walk across the room (includes cane/walker) No Help Needed Using the telphone No Help Needed Taking your medications No Help Needed Preparing meals No Help Needed Managing money (expenses/bills) No Help Needed Moderately strenuous housework (laundry) No Help Needed Shopping for personal items (toiletries/medicines) No Help Needed Shopping for groceries No Help Needed Driving No Help Needed Climbing a flight of stairs No Help Needed Getting to places beyond walking distances No Help Needed Medication reconciliation up to date and corrected with patient at this time.

## 2019-03-05 LAB
ALBUMIN SERPL-MCNC: 4.3 G/DL (ref 3.5–5.5)
ALBUMIN/GLOB SERPL: 1.6 {RATIO} (ref 1.2–2.2)
ALP SERPL-CCNC: 71 IU/L (ref 39–117)
ALT SERPL-CCNC: 17 IU/L (ref 0–44)
AST SERPL-CCNC: 17 IU/L (ref 0–40)
BILIRUB SERPL-MCNC: 0.6 MG/DL (ref 0–1.2)
BUN SERPL-MCNC: 14 MG/DL (ref 6–24)
BUN/CREAT SERPL: 11 (ref 9–20)
CALCIUM SERPL-MCNC: 9.4 MG/DL (ref 8.7–10.2)
CHLORIDE SERPL-SCNC: 102 MMOL/L (ref 96–106)
CHOLEST SERPL-MCNC: 161 MG/DL (ref 100–199)
CO2 SERPL-SCNC: 24 MMOL/L (ref 20–29)
CREAT SERPL-MCNC: 1.29 MG/DL (ref 0.76–1.27)
EST. AVERAGE GLUCOSE BLD GHB EST-MCNC: 123 MG/DL
GLOBULIN SER CALC-MCNC: 2.7 G/DL (ref 1.5–4.5)
GLUCOSE SERPL-MCNC: 96 MG/DL (ref 65–99)
HBA1C MFR BLD: 5.9 % (ref 4.8–5.6)
HDLC SERPL-MCNC: 48 MG/DL
LDLC SERPL CALC-MCNC: 79 MG/DL (ref 0–99)
POTASSIUM SERPL-SCNC: 4.4 MMOL/L (ref 3.5–5.2)
PROT SERPL-MCNC: 7 G/DL (ref 6–8.5)
PSA SERPL-MCNC: 0.8 NG/ML (ref 0–4)
SODIUM SERPL-SCNC: 140 MMOL/L (ref 134–144)
TRIGL SERPL-MCNC: 171 MG/DL (ref 0–149)
VLDLC SERPL CALC-MCNC: 34 MG/DL (ref 5–40)

## 2019-04-29 DIAGNOSIS — N52.9 ERECTILE DYSFUNCTION, UNSPECIFIED ERECTILE DYSFUNCTION TYPE: ICD-10-CM

## 2019-04-30 RX ORDER — SILDENAFIL CITRATE 20 MG/1
TABLET ORAL
Qty: 30 TAB | Refills: 3 | Status: SHIPPED | OUTPATIENT
Start: 2019-04-30 | End: 2020-05-28

## 2019-07-24 RX ORDER — ATORVASTATIN CALCIUM 20 MG/1
TABLET, FILM COATED ORAL
Qty: 90 TAB | Refills: 2 | Status: SHIPPED | OUTPATIENT
Start: 2019-07-24 | End: 2020-03-13 | Stop reason: SINTOL

## 2019-09-11 ENCOUNTER — OFFICE VISIT (OUTPATIENT)
Dept: INTERNAL MEDICINE CLINIC | Facility: CLINIC | Age: 54
End: 2019-09-11

## 2019-09-11 VITALS
RESPIRATION RATE: 16 BRPM | WEIGHT: 250.8 LBS | HEIGHT: 72 IN | BODY MASS INDEX: 33.97 KG/M2 | TEMPERATURE: 98.1 F | OXYGEN SATURATION: 97 % | HEART RATE: 65 BPM | SYSTOLIC BLOOD PRESSURE: 128 MMHG | DIASTOLIC BLOOD PRESSURE: 77 MMHG

## 2019-09-11 DIAGNOSIS — R73.03 PREDIABETES: ICD-10-CM

## 2019-09-11 DIAGNOSIS — E78.00 HIGH CHOLESTEROL: Primary | ICD-10-CM

## 2019-09-11 DIAGNOSIS — R20.2 PARESTHESIA OF BOTH HANDS: ICD-10-CM

## 2019-09-11 NOTE — PROGRESS NOTES
HPI  Mr. Cheryle Arm is a 48y.o. year old male, he is seen today for follow up high cholesterol, IFG. Has lost 8# with diet changes. No chest pain, sob, dizziness, weakness. Stopped statin about 3 weeks ago because he noticed hands were feeling numb more often in morning than in past, no weakness or muscle pains. Seems to be happening less often since stopping statin. Chief Complaint   Patient presents with    Cholesterol Problem     Room 2C// off statin x approx 3 weeks // fasting     Blood sugar problem        Prior to Admission medications    Medication Sig Start Date End Date Taking? Authorizing Provider   sildenafil, antihypertensive, (REVATIO) 20 mg tablet TAKE ONE TO THREE TABLETS BY MOUTH ONCE DAILY AS NEEDED 4/30/19  Yes Marina Christine MD   diclofenac (VOLTAREN) 1 % gel Apply 4 g to affected area four (4) times daily. 9/4/18  Yes Marina Christine MD   fluticasone (FLONASE) 50 mcg/actuation nasal spray 2 Sprays by Both Nostrils route daily. 7/2/18  Yes Marina Christine MD   calcium carbonate (TUMS) 200 mg calcium (500 mg) Chew Take 1 Tab by mouth daily. Yes Provider, Historical   atorvastatin (LIPITOR) 20 mg tablet TAKE 1 TABLET BY MOUTH EVERY DAY 7/24/19   Marina Christine MD         Allergies   Allergen Reactions    Sulfa (Sulfonamide Antibiotics) Hives         REVIEW OF SYSTEMS:  Per HPI    PHYSICAL EXAM:  Visit Vitals  /77 (BP 1 Location: Left arm, BP Patient Position: Sitting)   Pulse 65   Temp 98.1 °F (36.7 °C) (Oral)   Resp 16   Ht 6' (1.829 m)   Wt 250 lb 12.8 oz (113.8 kg)   SpO2 97%   BMI 34.01 kg/m²     Constitutional: Appears well-developed and well-nourished. No distress. HENT:   Head: Normocephalic and atraumatic. Eyes: No scleral icterus. Neck: no lad, no tm, supple, no pain on palpation, FROM  Cardiovascular: Normal S1/S2, regular rhythm. No murmurs, rubs, or gallops.   Pulmonary/Chest: Effort normal and breath sounds normal. No respiratory distress. No wheezes, rhonchi, or rales. Ext: No edema. 2+ radial pulses b/l  Neurological: Alert. Strength 5/5 b/l UE, tinel's neg b/l, sensation intact to light touch both hands, phalen's neg b/l  Psychiatric: Normal mood and affect. Behavior is normal.     Lab Results   Component Value Date/Time    Sodium 140 03/04/2019 09:14 AM    Potassium 4.4 03/04/2019 09:14 AM    Chloride 102 03/04/2019 09:14 AM    CO2 24 03/04/2019 09:14 AM    Glucose 96 03/04/2019 09:14 AM    BUN 14 03/04/2019 09:14 AM    Creatinine 1.29 (H) 03/04/2019 09:14 AM    BUN/Creatinine ratio 11 03/04/2019 09:14 AM    GFR est AA 74 03/04/2019 09:14 AM    GFR est non-AA 64 03/04/2019 09:14 AM    Calcium 9.4 03/04/2019 09:14 AM    Bilirubin, total 0.6 03/04/2019 09:14 AM    AST (SGOT) 17 03/04/2019 09:14 AM    Alk. phosphatase 71 03/04/2019 09:14 AM    Protein, total 7.0 03/04/2019 09:14 AM    Albumin 4.3 03/04/2019 09:14 AM    A-G Ratio 1.6 03/04/2019 09:14 AM    ALT (SGPT) 17 03/04/2019 09:14 AM     Lab Results   Component Value Date/Time    Hemoglobin A1c 5.9 (H) 03/04/2019 09:14 AM    Hemoglobin A1c 5.8 (H) 09/04/2018 02:58 PM    Hemoglobin A1c 5.7 (H) 03/01/2018 09:40 AM      Lab Results   Component Value Date/Time    Cholesterol, total 161 03/04/2019 09:14 AM    HDL Cholesterol 48 03/04/2019 09:14 AM    LDL, calculated 79 03/04/2019 09:14 AM    VLDL, calculated 34 03/04/2019 09:14 AM    Triglyceride 171 (H) 03/04/2019 09:14 AM          ASSESSMENT/PLAN  Diagnoses and all orders for this visit:    1. High cholesterol  -     LIPID PANEL  Off statin x 3 weeks - will stay off another 3-4 to see if tingling improves - if does or doesn't will restart as trial to see if related - I suspect CTS intermittent    2. Prediabetes  -     METABOLIC PANEL, COMPREHENSIVE  -     HEMOGLOBIN A1C WITH EAG  Should be better with weight loss  3. BMI 34.0-34.9,adult  Continue diet changes  4.  Paresthesia of both hands  See #1        There are no preventive care reminders to display for this patient. Follow-up and Dispositions    · Return in about 6 months (around 3/11/2020) for chol, ifg, well exam.            Reviewed plan of care. Patient has provided input and agrees with goals. The nurse provided the patient and/or family with advanced directive information if needed and encouraged the patient to provide a copy to the office when available.

## 2019-09-11 NOTE — PROGRESS NOTES
[place] [facility]  Identified pt with two pt identifiers(name and ). Chief Complaint   Patient presents with    Cholesterol Problem     Room 2C//     Blood sugar problem       1. Have you been to the ER, urgent care clinic since your last visit? Hospitalized since your last visit? NO    2. Have you seen or consulted any other health care providers outside of the [address] [place] [address] since your last visit? Include any pap smears or colon screening. NO      Provider notified of reason for visit, vitals and flowsheets obtained on patients. Patient received paperwork for advance directive during previous visit but has not completed at this time     Reviewed record In preparation for visit, huddled with provider and have obtained necessary documentation      There are no preventive care reminders to display for this patient. Wt Readings from Last 3 Encounters:   19 258 lb 3.2 oz (117.1 kg)   18 254 lb 3.2 oz (115.3 kg)   18 250 lb 9.6 oz (113.7 kg)     Temp Readings from Last 3 Encounters:   19 97.9 °F (36.6 °C) (Oral)   18 98 °F (36.7 °C) (Oral)   18 98.2 °F (36.8 °C) (Oral)     BP Readings from Last 3 Encounters:   19 116/80   18 118/74   18 118/78     Pulse Readings from Last 3 Encounters:   19 67   18 (!) 59   18 68     There were no vitals filed for this visit. Learning Assessment:  :     Learning Assessment 2014   PRIMARY LEARNER Patient   PRIMARY LANGUAGE ENGLISH   LEARNER PREFERENCE PRIMARY DEMONSTRATION   ANSWERED BY patient   RELATIONSHIP SELF       Depression Screening:  :     3 most recent PHQ Screens 3/4/2019   Little interest or pleasure in doing things Not at all   Feeling down, depressed, irritable, or hopeless Not at all   Total Score PHQ 2 0       Fall Risk Assessment:  :     Fall Risk Assessment, last 12 mths 2019   Able to walk? Yes   Fall in past 12 months?  No       Abuse Screening:  :     Abuse Screening Questionnaire 9/11/2019 8/28/2017   Do you ever feel afraid of your partner? N N   Are you in a relationship with someone who physically or mentally threatens you? N N   Is it safe for you to go home? Y Y       ADL Screening:  :     ADL Assessment 8/28/2017   Feeding yourself No Help Needed   Getting from bed to chair No Help Needed   Getting dressed No Help Needed   Bathing or showering No Help Needed   Walk across the room (includes cane/walker) No Help Needed   Using the telphone No Help Needed   Taking your medications No Help Needed   Preparing meals No Help Needed   Managing money (expenses/bills) No Help Needed   Moderately strenuous housework (laundry) No Help Needed   Shopping for personal items (toiletries/medicines) No Help Needed   Shopping for groceries No Help Needed   Driving No Help Needed   Climbing a flight of stairs No Help Needed   Getting to places beyond walking distances No Help Needed         Medication reconciliation up to date and corrected with patient at this time.

## 2019-09-12 LAB
ALBUMIN SERPL-MCNC: 4.6 G/DL (ref 3.5–5.5)
ALBUMIN/GLOB SERPL: 1.8 {RATIO} (ref 1.2–2.2)
ALP SERPL-CCNC: 72 IU/L (ref 39–117)
ALT SERPL-CCNC: 24 IU/L (ref 0–44)
AST SERPL-CCNC: 23 IU/L (ref 0–40)
BILIRUB SERPL-MCNC: 0.5 MG/DL (ref 0–1.2)
BUN SERPL-MCNC: 15 MG/DL (ref 6–24)
BUN/CREAT SERPL: 12 (ref 9–20)
CALCIUM SERPL-MCNC: 9.7 MG/DL (ref 8.7–10.2)
CHLORIDE SERPL-SCNC: 101 MMOL/L (ref 96–106)
CHOLEST SERPL-MCNC: 278 MG/DL (ref 100–199)
CO2 SERPL-SCNC: 26 MMOL/L (ref 20–29)
CREAT SERPL-MCNC: 1.3 MG/DL (ref 0.76–1.27)
EST. AVERAGE GLUCOSE BLD GHB EST-MCNC: 114 MG/DL
GLOBULIN SER CALC-MCNC: 2.5 G/DL (ref 1.5–4.5)
GLUCOSE SERPL-MCNC: 104 MG/DL (ref 65–99)
HBA1C MFR BLD: 5.6 % (ref 4.8–5.6)
HDLC SERPL-MCNC: 48 MG/DL
LDLC SERPL CALC-MCNC: 181 MG/DL (ref 0–99)
POTASSIUM SERPL-SCNC: 4.8 MMOL/L (ref 3.5–5.2)
PROT SERPL-MCNC: 7.1 G/DL (ref 6–8.5)
SODIUM SERPL-SCNC: 140 MMOL/L (ref 134–144)
TRIGL SERPL-MCNC: 245 MG/DL (ref 0–149)
VLDLC SERPL CALC-MCNC: 49 MG/DL (ref 5–40)

## 2020-03-12 ENCOUNTER — OFFICE VISIT (OUTPATIENT)
Dept: INTERNAL MEDICINE CLINIC | Facility: CLINIC | Age: 55
End: 2020-03-12

## 2020-03-12 VITALS
TEMPERATURE: 98 F | SYSTOLIC BLOOD PRESSURE: 132 MMHG | HEART RATE: 73 BPM | BODY MASS INDEX: 34.89 KG/M2 | RESPIRATION RATE: 16 BRPM | HEIGHT: 72 IN | WEIGHT: 257.6 LBS | DIASTOLIC BLOOD PRESSURE: 80 MMHG | OXYGEN SATURATION: 99 %

## 2020-03-12 DIAGNOSIS — Z00.00 WELL ADULT EXAM: Primary | ICD-10-CM

## 2020-03-12 DIAGNOSIS — E78.00 HIGH CHOLESTEROL: ICD-10-CM

## 2020-03-12 DIAGNOSIS — Z12.5 PROSTATE CANCER SCREENING: ICD-10-CM

## 2020-03-12 DIAGNOSIS — R73.03 PREDIABETES: ICD-10-CM

## 2020-03-12 RX ORDER — FLUTICASONE PROPIONATE 50 MCG
2 SPRAY, SUSPENSION (ML) NASAL DAILY
Qty: 1 BOTTLE | Refills: 2 | Status: SHIPPED | OUTPATIENT
Start: 2020-03-12 | End: 2020-04-13

## 2020-03-12 NOTE — PROGRESS NOTES
HPI  Mr. Shania Gomez is a 48y.o. year old male, he is seen today for well exam.   No chest pain, sob, dizziness, weakness. No f/c or sweats. Past couple of days has had postnasal drip and mild cough. No sore throat. No n/v/abd pain,melena or brbpr. No increased urinary frequency, urgency, difficulty emptying bladder. Hasn't been taking lipitor and myalgias stopped afterwards. Wanted to see if cholesterol was better. Weight same but has changed diet - more grilled food, cutting back bread. Trying to drink more water. Chief Complaint   Patient presents with    Cholesterol Problem    Blood sugar problem        Prior to Admission medications    Medication Sig Start Date End Date Taking? Authorizing Provider   fluticasone propionate (FLONASE) 50 mcg/actuation nasal spray 2 Sprays by Both Nostrils route daily. 3/12/20  Yes Norma Corona MD   atorvastatin (LIPITOR) 20 mg tablet TAKE 1 TABLET BY MOUTH EVERY DAY 7/24/19  Yes Norma Corona MD   sildenafil, antihypertensive, (REVATIO) 20 mg tablet TAKE ONE TO THREE TABLETS BY MOUTH ONCE DAILY AS NEEDED 4/30/19  Yes Norma Corona MD   diclofenac (VOLTAREN) 1 % gel Apply 4 g to affected area four (4) times daily. 9/4/18  Yes Norma Corona MD   calcium carbonate (TUMS) 200 mg calcium (500 mg) Chew Take 1 Tab by mouth daily. Yes Provider, Historical   fluticasone (FLONASE) 50 mcg/actuation nasal spray 2 Sprays by Both Nostrils route daily. 7/2/18 3/12/20  Norma Corona MD         Allergies   Allergen Reactions    Sulfa (Sulfonamide Antibiotics) Hives         REVIEW OF SYSTEMS:  Per HPI    PHYSICAL EXAM:  Visit Vitals  /80   Pulse 73   Temp 98 °F (36.7 °C) (Oral)   Resp 16   Ht 6' (1.829 m)   Wt 257 lb 9.6 oz (116.8 kg)   SpO2 99%   BMI 34.94 kg/m²     Constitutional: Appears well-developed and well-nourished. No distress. HENT:   Head: Normocephalic and atraumatic. Eyes: No scleral icterus.    Neck: no lad, no tm, supple Cardiovascular: Normal S1/S2, regular rhythm. No murmurs, rubs, or gallops. Pulmonary/Chest: Effort normal and breath sounds normal. No respiratory distress. No wheezes, rhonchi, or rales. Abdomen: Soft, NT/ND, +BS, no rebound or guarding, no masses, no HSM appreciated. Ext: No edema. Neurological: Alert. Psychiatric: Normal mood and affect. Behavior is normal.     Lab Results   Component Value Date/Time    Sodium 140 09/11/2019 09:30 AM    Potassium 4.8 09/11/2019 09:30 AM    Chloride 101 09/11/2019 09:30 AM    CO2 26 09/11/2019 09:30 AM    Glucose 104 (H) 09/11/2019 09:30 AM    BUN 15 09/11/2019 09:30 AM    Creatinine 1.30 (H) 09/11/2019 09:30 AM    BUN/Creatinine ratio 12 09/11/2019 09:30 AM    GFR est AA 73 09/11/2019 09:30 AM    GFR est non-AA 63 09/11/2019 09:30 AM    Calcium 9.7 09/11/2019 09:30 AM    Bilirubin, total 0.5 09/11/2019 09:30 AM    AST (SGOT) 23 09/11/2019 09:30 AM    Alk. phosphatase 72 09/11/2019 09:30 AM    Protein, total 7.1 09/11/2019 09:30 AM    Albumin 4.6 09/11/2019 09:30 AM    A-G Ratio 1.8 09/11/2019 09:30 AM    ALT (SGPT) 24 09/11/2019 09:30 AM     Lab Results   Component Value Date/Time    Hemoglobin A1c 5.6 09/11/2019 09:30 AM    Hemoglobin A1c 5.9 (H) 03/04/2019 09:14 AM    Hemoglobin A1c 5.8 (H) 09/04/2018 02:58 PM      Lab Results   Component Value Date/Time    Cholesterol, total 278 (H) 09/11/2019 09:30 AM    HDL Cholesterol 48 09/11/2019 09:30 AM    LDL, calculated 181 (H) 09/11/2019 09:30 AM    VLDL, calculated 49 (H) 09/11/2019 09:30 AM    Triglyceride 245 (H) 09/11/2019 09:30 AM          ASSESSMENT/PLAN  Diagnoses and all orders for this visit:    1. Well adult exam  -     LIPID PANEL; Future  -     HEMOGLOBIN A1C WITH EAG; Future  -     METABOLIC PANEL, COMPREHENSIVE; Future  Has been off statin - cannot tolerate lipitor - get baseline lipids  Work on portion control, more exercise, more fruits/vegetables for weight management  2. High cholesterol    3. Prediabetes  -     HEMOGLOBIN A1C WITH EAG; Future    4. BMI 35.0-35.9,adult  See #1  5. Prostate cancer screening  -     PSA, DIAGNOSTIC (PROSTATE SPECIFIC AG); Future    Other orders  -     fluticasone propionate (FLONASE) 50 mcg/actuation nasal spray; 2 Sprays by Both Nostrils route daily. There are no preventive care reminders to display for this patient. Follow-up and Dispositions    · Return in about 6 months (around 9/12/2020) for chol. Reviewed plan of care. Patient has provided input and agrees with goals. The nurse provided the patient and/or family with advanced directive information if needed and encouraged the patient to provide a copy to the office when available.

## 2020-03-12 NOTE — PROGRESS NOTES
[employer] [facility]  Identified pt with two pt identifiers(name and ). Chief Complaint   Patient presents with    Cholesterol Problem    Blood sugar problem       1. Have you been to the ER, urgent care clinic since your last visit? Hospitalized since your last visit? NO    2. Have you seen or consulted any other health care providers outside of the [address] [place] [address] since your last visit? Include any pap smears or colon screening. NO      Provider notified of reason for visit, vitals and flowsheets obtained on patients. Patient received paperwork for advance directive during previous visit but has not completed at this time     Reviewed record In preparation for visit, huddled with provider and have obtained necessary documentation      There are no preventive care reminders to display for this patient. Wt Readings from Last 3 Encounters:   19 250 lb 12.8 oz (113.8 kg)   19 258 lb 3.2 oz (117.1 kg)   18 254 lb 3.2 oz (115.3 kg)     Temp Readings from Last 3 Encounters:   19 98.1 °F (36.7 °C) (Oral)   19 97.9 °F (36.6 °C) (Oral)   18 98 °F (36.7 °C) (Oral)     BP Readings from Last 3 Encounters:   19 128/77   19 116/80   18 118/74     Pulse Readings from Last 3 Encounters:   19 65   19 67   18 (!) 59     There were no vitals filed for this visit. Learning Assessment:  :     Learning Assessment 2014   PRIMARY LEARNER Patient   PRIMARY LANGUAGE ENGLISH   LEARNER PREFERENCE PRIMARY DEMONSTRATION   ANSWERED BY patient   RELATIONSHIP SELF       Depression Screening:  :     3 most recent PHQ Screens 3/12/2020   Little interest or pleasure in doing things Not at all   Feeling down, depressed, irritable, or hopeless Not at all   Total Score PHQ 2 0       Fall Risk Assessment:  :     Fall Risk Assessment, last 12 mths 2019   Able to walk? Yes   Fall in past 12 months?  No       Abuse Screening:  :     Abuse Screening Questionnaire 9/11/2019 8/28/2017   Do you ever feel afraid of your partner? N N   Are you in a relationship with someone who physically or mentally threatens you? N N   Is it safe for you to go home? Y Y       ADL Screening:  :     ADL Assessment 8/28/2017   Feeding yourself No Help Needed   Getting from bed to chair No Help Needed   Getting dressed No Help Needed   Bathing or showering No Help Needed   Walk across the room (includes cane/walker) No Help Needed   Using the telphone No Help Needed   Taking your medications No Help Needed   Preparing meals No Help Needed   Managing money (expenses/bills) No Help Needed   Moderately strenuous housework (laundry) No Help Needed   Shopping for personal items (toiletries/medicines) No Help Needed   Shopping for groceries No Help Needed   Driving No Help Needed   Climbing a flight of stairs No Help Needed   Getting to places beyond walking distances No Help Needed         Medication reconciliation up to date and corrected with patient at this time.

## 2020-03-13 LAB
ALBUMIN SERPL-MCNC: 4.7 G/DL (ref 3.8–4.9)
ALBUMIN/GLOB SERPL: 2.4 {RATIO} (ref 1.2–2.2)
ALP SERPL-CCNC: 61 IU/L (ref 39–117)
ALT SERPL-CCNC: 23 IU/L (ref 0–44)
AST SERPL-CCNC: 21 IU/L (ref 0–40)
BILIRUB SERPL-MCNC: 0.4 MG/DL (ref 0–1.2)
BUN SERPL-MCNC: 13 MG/DL (ref 6–24)
BUN/CREAT SERPL: 11 (ref 9–20)
CALCIUM SERPL-MCNC: 9.6 MG/DL (ref 8.7–10.2)
CHLORIDE SERPL-SCNC: 101 MMOL/L (ref 96–106)
CHOLEST SERPL-MCNC: 275 MG/DL (ref 100–199)
CO2 SERPL-SCNC: 23 MMOL/L (ref 20–29)
CREAT SERPL-MCNC: 1.17 MG/DL (ref 0.76–1.27)
EST. AVERAGE GLUCOSE BLD GHB EST-MCNC: 117 MG/DL
GLOBULIN SER CALC-MCNC: 2 G/DL (ref 1.5–4.5)
GLUCOSE SERPL-MCNC: 109 MG/DL (ref 65–99)
HBA1C MFR BLD: 5.7 % (ref 4.8–5.6)
HDLC SERPL-MCNC: 48 MG/DL
LDLC SERPL CALC-MCNC: 179 MG/DL (ref 0–99)
POTASSIUM SERPL-SCNC: 4.7 MMOL/L (ref 3.5–5.2)
PROT SERPL-MCNC: 6.7 G/DL (ref 6–8.5)
PSA SERPL-MCNC: 0.9 NG/ML (ref 0–4)
SODIUM SERPL-SCNC: 140 MMOL/L (ref 134–144)
TRIGL SERPL-MCNC: 239 MG/DL (ref 0–149)
VLDLC SERPL CALC-MCNC: 48 MG/DL (ref 5–40)

## 2020-03-13 RX ORDER — ROSUVASTATIN CALCIUM 5 MG/1
5 TABLET, COATED ORAL
Qty: 30 TAB | Refills: 5 | Status: SHIPPED | OUTPATIENT
Start: 2020-03-13 | End: 2020-12-30 | Stop reason: SDUPTHER

## 2020-03-13 NOTE — PROGRESS NOTES
Tell him labs okay other than high cholesterol - want him to try crestor - let me know if he has muscle pain

## 2020-04-13 RX ORDER — FLUTICASONE PROPIONATE 50 MCG
SPRAY, SUSPENSION (ML) NASAL
Qty: 1 BOTTLE | Refills: 2 | Status: SHIPPED | OUTPATIENT
Start: 2020-04-13 | End: 2021-01-21

## 2020-05-28 DIAGNOSIS — N52.9 ERECTILE DYSFUNCTION, UNSPECIFIED ERECTILE DYSFUNCTION TYPE: ICD-10-CM

## 2020-05-28 RX ORDER — SILDENAFIL CITRATE 20 MG/1
TABLET ORAL
Qty: 30 TAB | Refills: 2 | Status: SHIPPED | OUTPATIENT
Start: 2020-05-28 | End: 2021-04-07

## 2020-10-03 ENCOUNTER — OFFICE VISIT (OUTPATIENT)
Dept: URGENT CARE | Age: 55
End: 2020-10-03
Payer: COMMERCIAL

## 2020-10-03 VITALS — OXYGEN SATURATION: 97 % | HEART RATE: 87 BPM | RESPIRATION RATE: 18 BRPM | TEMPERATURE: 98.3 F

## 2020-10-03 DIAGNOSIS — Z20.822 CLOSE EXPOSURE TO COVID-19 VIRUS: Primary | ICD-10-CM

## 2020-10-03 PROCEDURE — 99202 OFFICE O/P NEW SF 15 MIN: CPT | Performed by: NURSE PRACTITIONER

## 2020-10-03 NOTE — PATIENT INSTRUCTIONS
9 Things To Do If You've Been Exposed to [phone] Stay home. If you've been exposed, you should stay in isolation for 14 days. Don't go to school, work, or public areas. And don't use public transportation, ride-shares, or taxis unless you have no choice. Leave your home only if you need to get medical care. But call the doctor's office first so they know you're coming, and wear a cloth face cover when you go. Call your doctor. Call your doctor or other health professional to let them know that you've been exposed. They might want you to be tested, or they may have other instructions for you. If you become sick, wear a face cover when you are around other people. It can help stop the spread of the virus when you cough or sneeze. Limit contact with people in your home. If possible, stay in a separate bedroom and use a separate bathroom. Avoid contact with pets and other animals. Cover your mouth and nose with a tissue when you cough or sneeze. Then throw it in the trash right away. Wash your hands often, especially after you cough or sneeze. Use soap and water, and scrub for at least 20 seconds. If soap and water aren't available, use an alcohol-based hand . Don't share personal household items. These include bedding, towels, cups and glasses, and eating utensils. Clean and disinfect your home every day. Use household  or disinfectant wipes or sprays. Take special care to clean things that you grab with your hands. These include doorknobs, remote controls, phones, and handles on your refrigerator and microwave. And don't forget countertops, tabletops, bathrooms, and computer keyboards. Current as of: July 10, 2020               Content Version: 12.6 © 1461-2208 [employer]. Care instructions adapted under license by [employer] (which disclaims liability or warranty for this information).  If you have questions about a medical condition or this instruction, always ask your healthcare professional. [place] [address] any warranty or liability for your use of this information.
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
## 2020-10-07 LAB — SARS-COV-2, NAA: NOT DETECTED

## 2020-10-30 ENCOUNTER — TRANSCRIBE ORDER (OUTPATIENT)
Dept: SCHEDULING | Age: 55
End: 2020-10-30

## 2020-10-30 DIAGNOSIS — S83.242A TEAR OF MEDIAL MENISCUS OF LEFT KNEE: Primary | ICD-10-CM

## 2020-11-11 ENCOUNTER — HOSPITAL ENCOUNTER (OUTPATIENT)
Dept: CT IMAGING | Age: 55
Discharge: HOME | End: 2020-11-11
Attending: ORTHOPAEDIC SURGERY
Payer: COMMERCIAL

## 2020-11-11 ENCOUNTER — HOSPITAL ENCOUNTER (OUTPATIENT)
Dept: GENERAL RADIOLOGY | Age: 55
Discharge: HOME | End: 2020-11-11
Attending: ORTHOPAEDIC SURGERY
Payer: COMMERCIAL

## 2020-11-11 DIAGNOSIS — S83.242A TEAR OF MEDIAL MENISCUS OF LEFT KNEE: ICD-10-CM

## 2020-11-11 PROCEDURE — 73701 CT LOWER EXTREMITY W/DYE: CPT

## 2020-11-11 PROCEDURE — 74011000636 HC RX REV CODE- 636: Performed by: RADIOLOGY

## 2020-11-11 PROCEDURE — 27369 NJX CNTRST KNE ARTHG/CT/MRI: CPT

## 2020-11-11 PROCEDURE — 74011000250 HC RX REV CODE- 250: Performed by: RADIOLOGY

## 2020-11-11 PROCEDURE — 74011000250 HC RX REV CODE- 250

## 2020-11-11 RX ORDER — SODIUM BICARBONATE 42 MG/ML
1 INJECTION, SOLUTION INTRAVENOUS
Status: COMPLETED | OUTPATIENT
Start: 2020-11-11 | End: 2020-11-11

## 2020-11-11 RX ORDER — LIDOCAINE HYDROCHLORIDE 10 MG/ML
INJECTION, SOLUTION EPIDURAL; INFILTRATION; INTRACAUDAL; PERINEURAL
Status: COMPLETED
Start: 2020-11-11 | End: 2020-11-11

## 2020-11-11 RX ADMIN — SODIUM BICARBONATE 2 ML: 42 INJECTION, SOLUTION INTRAVENOUS at 13:00

## 2020-11-11 RX ADMIN — IOHEXOL 10 ML: 180 INJECTION INTRAVENOUS at 13:00

## 2020-11-11 RX ADMIN — LIDOCAINE HYDROCHLORIDE 5 ML: 10 INJECTION, SOLUTION EPIDURAL; INFILTRATION; INTRACAUDAL; PERINEURAL at 14:00

## 2020-12-30 ENCOUNTER — OFFICE VISIT (OUTPATIENT)
Dept: INTERNAL MEDICINE CLINIC | Age: 55
End: 2020-12-30
Payer: COMMERCIAL

## 2020-12-30 VITALS
TEMPERATURE: 98.2 F | BODY MASS INDEX: 35.49 KG/M2 | OXYGEN SATURATION: 97 % | RESPIRATION RATE: 16 BRPM | SYSTOLIC BLOOD PRESSURE: 148 MMHG | DIASTOLIC BLOOD PRESSURE: 90 MMHG | WEIGHT: 262 LBS | HEIGHT: 72 IN | HEART RATE: 80 BPM

## 2020-12-30 DIAGNOSIS — Z12.5 PROSTATE CANCER SCREENING: ICD-10-CM

## 2020-12-30 DIAGNOSIS — R73.03 PREDIABETES: ICD-10-CM

## 2020-12-30 DIAGNOSIS — R03.0 ELEVATED BP WITHOUT DIAGNOSIS OF HYPERTENSION: Primary | ICD-10-CM

## 2020-12-30 DIAGNOSIS — E78.00 HIGH CHOLESTEROL: ICD-10-CM

## 2020-12-30 PROCEDURE — 99214 OFFICE O/P EST MOD 30 MIN: CPT | Performed by: INTERNAL MEDICINE

## 2020-12-30 RX ORDER — ROSUVASTATIN CALCIUM 5 MG/1
5 TABLET, COATED ORAL
Qty: 30 TAB | Refills: 5 | Status: SHIPPED | OUTPATIENT
Start: 2020-12-30 | End: 2021-11-03 | Stop reason: SDUPTHER

## 2020-12-30 NOTE — PROGRESS NOTES
Chief Complaint   Patient presents with    Cholesterol Problem     6 months       1. Have you been to the ER, urgent care clinic since your last visit? Hospitalized since your last visit? Yes When: 12/15/2020 left knee surgery at [facility] Dr. Ayo Benson with [facility]    2. Have you seen or consulted any other health care providers outside of the [address] [place] [address] since your last visit? Include any pap smears or colon screening.  No    Visit Vitals  BP (!) 155/90 (BP 1 Location: Left arm, BP Patient Position: Sitting)   Pulse 80   Temp 98.2 °F (36.8 °C) (Oral)   Resp 16   Ht 6' (1.829 m)   Wt 262 lb (118.8 kg)   SpO2 97%   BMI 35.53 kg/m²

## 2020-12-30 NOTE — PROGRESS NOTES
HPI  Mr. Jose Singh is a 50y.o. year old male, he is seen today for follow up high cholesterol. Had left knee surgery - arthroscopic for meniscal tear - about 2 weeks ago and is back to work - feeling good. No chest pain, sob, dizziness, weakness, lightheadedness. Was on rosuvastatin and didn't have issues with it, ran out and hasn't been taking. Has been out for several months. Chief Complaint   Patient presents with    Cholesterol Problem     6 months        Prior to Admission medications    Medication Sig Start Date End Date Taking? Authorizing Provider   sildenafiL, pulmonary hypertension, (REVATIO) 20 mg tablet TAKE ONE TO THREE TABLETS BY MOUTH ONCE DAILY AS NEEDED 5/28/20  Yes Ruth Tripp MD   fluticasone propionate (FLONASE) 50 mcg/actuation nasal spray SPRAY 2 SPRAYS INTO EACH NOSTRIL EVERY DAY 4/13/20  Yes Ruth Tripp MD   diclofenac (VOLTAREN) 1 % gel Apply 4 g to affected area four (4) times daily. Patient taking differently: Apply 4 g to affected area as needed. 9/4/18  Yes Ruth Tripp MD   calcium carbonate (TUMS) 200 mg calcium (500 mg) Chew Take 1 Tab by mouth daily. Yes Provider, Historical   rosuvastatin (CRESTOR) 5 mg tablet Take 1 Tab by mouth nightly. 3/13/20   Ruth Tripp MD         Allergies   Allergen Reactions    Lipitor [Atorvastatin] Myalgia    Sulfa (Sulfonamide Antibiotics) Hives         REVIEW OF SYSTEMS:  Per HPI    PHYSICAL EXAM:  Visit Vitals  BP (!) 148/90   Pulse 80   Temp 98.2 °F (36.8 °C) (Oral)   Resp 16   Ht 6' (1.829 m)   Wt 262 lb (118.8 kg)   SpO2 97%   BMI 35.53 kg/m²     Constitutional: Appears well-developed and well-nourished. No distress. HENT:   Head: Normocephalic and atraumatic. Eyes: No scleral icterus. Cardiovascular: Normal S1/S2, regular rhythm. No murmurs, rubs, or gallops. Pulmonary/Chest: Effort normal and breath sounds normal. No respiratory distress. No wheezes, rhonchi, or rales. Ext: No edema. Neurological: Alert. Psychiatric: Normal mood and affect. Behavior is normal.     Lab Results   Component Value Date/Time    Sodium 140 03/12/2020 11:08 AM    Potassium 4.7 03/12/2020 11:08 AM    Chloride 101 03/12/2020 11:08 AM    CO2 23 03/12/2020 11:08 AM    Glucose 109 (H) 03/12/2020 11:08 AM    BUN 13 03/12/2020 11:08 AM    Creatinine 1.17 03/12/2020 11:08 AM    BUN/Creatinine ratio 11 03/12/2020 11:08 AM    GFR est AA 82 03/12/2020 11:08 AM    GFR est non-AA 71 03/12/2020 11:08 AM    Calcium 9.6 03/12/2020 11:08 AM    Bilirubin, total 0.4 03/12/2020 11:08 AM    Alk. phosphatase 61 03/12/2020 11:08 AM    Protein, total 6.7 03/12/2020 11:08 AM    Albumin 4.7 03/12/2020 11:08 AM    A-G Ratio 2.4 (H) 03/12/2020 11:08 AM    ALT (SGPT) 23 03/12/2020 11:08 AM     Lab Results   Component Value Date/Time    Hemoglobin A1c 5.7 (H) 03/12/2020 11:08 AM    Hemoglobin A1c 5.6 09/11/2019 09:30 AM    Hemoglobin A1c 5.9 (H) 03/04/2019 09:14 AM      Lab Results   Component Value Date/Time    Cholesterol, total 275 (H) 03/12/2020 11:08 AM    HDL Cholesterol 48 03/12/2020 11:08 AM    LDL, calculated 179 (H) 03/12/2020 11:08 AM    VLDL, calculated 48 (H) 03/12/2020 11:08 AM    Triglyceride 239 (H) 03/12/2020 11:08 AM          ASSESSMENT/PLAN  Diagnoses and all orders for this visit:    1. Elevated BP without diagnosis of hypertension  Will recheck at follow up - work on adding exercise, weight loss, less salt  2. Prostate cancer screening  -     PSA SCREENING (SCREENING)  -     PSA SCREENING (SCREENING); Future    3. High cholesterol  -     METABOLIC PANEL, COMPREHENSIVE  -     LIPID PANEL  -     METABOLIC PANEL, COMPREHENSIVE; Future  -     LIPID PANEL; Future  Restart crestor - recheck in 3 mos  4. Prediabetes  -     HEMOGLOBIN A1C WITH EAG;  Future          Health Maintenance Due   Topic Date Due    Shingrix Vaccine Age 49> (1 of 2) 10/05/2017        Follow-up and Dispositions    · Return in about 3 months (around 3/30/2021) for bp, chol, labs prior. Reviewed plan of care. Patient has provided input and agrees with goals. The nurse provided the patient and/or family with advanced directive information if needed and encouraged the patient to provide a copy to the office when available.

## 2021-01-09 LAB
ALBUMIN SERPL-MCNC: 4.5 G/DL (ref 3.8–4.9)
ALBUMIN/GLOB SERPL: 1.9 {RATIO} (ref 1.2–2.2)
ALP SERPL-CCNC: 72 IU/L (ref 39–117)
ALT SERPL-CCNC: 19 IU/L (ref 0–44)
AST SERPL-CCNC: 17 IU/L (ref 0–40)
BILIRUB SERPL-MCNC: 0.5 MG/DL (ref 0–1.2)
BUN SERPL-MCNC: 14 MG/DL (ref 6–24)
BUN/CREAT SERPL: 11 (ref 9–20)
CALCIUM SERPL-MCNC: 9.5 MG/DL (ref 8.7–10.2)
CHLORIDE SERPL-SCNC: 101 MMOL/L (ref 96–106)
CHOLEST SERPL-MCNC: 197 MG/DL (ref 100–199)
CO2 SERPL-SCNC: 23 MMOL/L (ref 20–29)
CREAT SERPL-MCNC: 1.26 MG/DL (ref 0.76–1.27)
EST. AVERAGE GLUCOSE BLD GHB EST-MCNC: 123 MG/DL
GLOBULIN SER CALC-MCNC: 2.4 G/DL (ref 1.5–4.5)
GLUCOSE SERPL-MCNC: 95 MG/DL (ref 65–99)
HBA1C MFR BLD: 5.9 % (ref 4.8–5.6)
HDLC SERPL-MCNC: 50 MG/DL
LDLC SERPL CALC-MCNC: 106 MG/DL (ref 0–99)
POTASSIUM SERPL-SCNC: 4.4 MMOL/L (ref 3.5–5.2)
PROT SERPL-MCNC: 6.9 G/DL (ref 6–8.5)
PSA SERPL-MCNC: 0.8 NG/ML (ref 0–4)
SODIUM SERPL-SCNC: 139 MMOL/L (ref 134–144)
TRIGL SERPL-MCNC: 242 MG/DL (ref 0–149)
VLDLC SERPL CALC-MCNC: 41 MG/DL (ref 5–40)

## 2021-01-20 ENCOUNTER — TELEPHONE (OUTPATIENT)
Dept: INTERNAL MEDICINE CLINIC | Age: 56
End: 2021-01-20

## 2021-01-20 NOTE — TELEPHONE ENCOUNTER
The patient was transferred from the back line. He verified his name and date of birth. He informed me that he had an elevated bp today. He took his bp at work at it was 170/110, then went home and used a friend's machine which read 167/102. With the friends machine he took his bp in both arms. The left arm read the highest and in the right arm \"the bottom number went down and the top number went up\". States he feels fine and having no symptoms. He is currently not taking any bp medications. BP was elevated at last appointment on 12/30, but it was not bad. Had it checked before today at another appointment with a family member and it was about the same as his appointment in December. After consulting with Myra Cui and KARAN Yousif we informed the patient to make an appointment for tomorrow with [facility] and if he starts experiencing any nausea, vomiting, headaches, dizziness or feeling like they are going to pass out proceed immediately to the ER. He stated understanding and is seeing [facility] on tomorrow (01/21/2021).

## 2021-01-21 ENCOUNTER — OFFICE VISIT (OUTPATIENT)
Dept: INTERNAL MEDICINE CLINIC | Age: 56
End: 2021-01-21
Payer: COMMERCIAL

## 2021-01-21 VITALS
WEIGHT: 264 LBS | HEIGHT: 72 IN | TEMPERATURE: 98.2 F | HEART RATE: 81 BPM | RESPIRATION RATE: 14 BRPM | SYSTOLIC BLOOD PRESSURE: 150 MMHG | DIASTOLIC BLOOD PRESSURE: 92 MMHG | OXYGEN SATURATION: 98 % | BODY MASS INDEX: 35.76 KG/M2

## 2021-01-21 DIAGNOSIS — I10 ESSENTIAL HYPERTENSION: Primary | ICD-10-CM

## 2021-01-21 PROCEDURE — 99213 OFFICE O/P EST LOW 20 MIN: CPT | Performed by: NURSE PRACTITIONER

## 2021-01-21 RX ORDER — AMLODIPINE BESYLATE 5 MG/1
5 TABLET ORAL DAILY
Qty: 30 TAB | Refills: 0 | Status: SHIPPED | OUTPATIENT
Start: 2021-01-21 | End: 2021-02-12

## 2021-01-21 NOTE — PATIENT INSTRUCTIONS
High Blood Pressure: Care Instructions Overview It's normal for blood pressure to go up and down throughout the day. But if it stays up, you have high blood pressure. Another name for high blood pressure is hypertension. Despite what a lot of people think, high blood pressure usually doesn't cause headaches or make you feel dizzy or lightheaded. It usually has no symptoms. But it does increase your risk of stroke, heart attack, and other problems. You and your doctor will talk about your risks of these problems based on your blood pressure. Your doctor will give you a goal for your blood pressure. Your goal will be based on your health and your age. Lifestyle changes, such as eating healthy and being active, are always important to help lower blood pressure. You might also take medicine to reach your blood pressure goal. 
Follow-up care is a key part of your treatment and safety. Be sure to make and go to all appointments, and call your doctor if you are having problems. It's also a good idea to know your test results and keep a list of the medicines you take. How can you care for yourself at home? Medical treatment · If you stop taking your medicine, your blood pressure will go back up. You may take one or more types of medicine to lower your blood pressure. Be safe with medicines. Take your medicine exactly as prescribed. Call your doctor if you think you are having a problem with your medicine. · Talk to your doctor before you start taking aspirin every day. Aspirin can help certain people lower their risk of a heart attack or stroke. But taking aspirin isn't right for everyone, because it can cause serious bleeding. · See your doctor regularly. You may need to see the doctor more often at first or until your blood pressure comes down. · If you are taking blood pressure medicine, talk to your doctor before you take decongestants or anti-inflammatory medicine, such as ibuprofen. Some of these medicines can raise blood pressure. · Learn how to check your blood pressure at home. Lifestyle changes · Stay at a healthy weight. This is especially important if you put on weight around the waist. Losing even 10 pounds can help you lower your blood pressure. · If your doctor recommends it, get more exercise. Walking is a good choice. Bit by bit, increase the amount you walk every day. Try for at least 30 minutes on most days of the week. You also may want to swim, bike, or do other activities. · Avoid or limit alcohol. Talk to your doctor about whether you can drink any alcohol. · Try to limit how much sodium you eat to less than 2,300 milligrams (mg) a day. Your doctor may ask you to try to eat less than 1,500 mg a day. · Eat plenty of fruits (such as bananas and oranges), vegetables, legumes, whole grains, and low-fat dairy products. · Lower the amount of saturated fat in your diet. Saturated fat is found in animal products such as milk, cheese, and meat. Limiting these foods may help you lose weight and also lower your risk for heart disease. · Do not smoke. Smoking increases your risk for heart attack and stroke. If you need help quitting, talk to your doctor about stop-smoking programs and medicines. These can increase your chances of quitting for good. When should you call for help? Call  911 anytime you think you may need emergency care. This may mean having symptoms that suggest that your blood pressure is causing a serious heart or blood vessel problem. Your blood pressure may be over 180/120. For example, call 911 if: 
  · You have symptoms of a heart attack. These may include: 
? Chest pain or pressure, or a strange feeling in the chest. 
? Sweating. ? Shortness of breath. ? Nausea or vomiting. ? Pain, pressure, or a strange feeling in the back, neck, jaw, or upper belly or in one or both shoulders or arms. ? Lightheadedness or sudden weakness. ? A fast or irregular heartbeat.  
  · You have symptoms of a stroke. These may include: 
? Sudden numbness, tingling, weakness, or loss of movement in your face, arm, or leg, especially on only one side of your body. ? Sudden vision changes. ? Sudden trouble speaking. ? Sudden confusion or trouble understanding simple statements. ? Sudden problems with walking or balance. ? A sudden, severe headache that is different from past headaches.  
  · You have severe back or belly pain. Do not wait until your blood pressure comes down on its own. Get help right away. Call your doctor now or seek immediate care if: 
  · Your blood pressure is much higher than normal (such as 180/120 or higher), but you don't have symptoms.  
  · You think high blood pressure is causing symptoms, such as: 
? Severe headache. 
? Blurry vision. Watch closely for changes in your health, and be sure to contact your doctor if: 
  · Your blood pressure measures higher than your doctor recommends at least 2 times. That means the top number is higher or the bottom number is higher, or both.  
  · You think you may be having side effects from your blood pressure medicine. Where can you learn more? Go to http://www.gray.com/ Enter N766 in the search box to learn more about \"High Blood Pressure: Care Instructions. \" Current as of: December 16, 2019               Content Version: 12.6 © 7249-3752 [employer]. Care instructions adapted under license by [employer] (which disclaims liability or warranty for this information). If you have questions about a medical condition or this instruction, always ask your healthcare professional. [address] any warranty or liability for your use of this information.
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
## 2021-01-21 NOTE — PROGRESS NOTES
Deon Huang is a 50 y.o. male  Chief Complaint   Patient presents with    Hypertension     Health Maintenance Due   Topic Date Due    COVID-19 Vaccine (1 of 2) 10/05/1983    Shingrix Vaccine Age 50> (1 of 2) 10/05/2017     Visit Vitals  BP (!) 177/76 (BP 1 Location: Right arm, BP Patient Position: Sitting)   Pulse 81   Temp 98.2 °F (36.8 °C) (Oral)   Resp 14   Ht 6' (1.829 m)   Wt 264 lb (119.7 kg)   SpO2 98%   BMI 35.80 kg/m²     1. Have you been to the ER, urgent care clinic since your last visit? Hospitalized since your last visit? No    2. Have you seen or consulted any other health care providers outside of the [address] [place] [address] since your last visit? Include any pap smears or colon screening.  No

## 2021-02-12 DIAGNOSIS — I10 ESSENTIAL HYPERTENSION: ICD-10-CM

## 2021-02-12 RX ORDER — AMLODIPINE BESYLATE 5 MG/1
TABLET ORAL
Qty: 30 TAB | Refills: 0 | Status: SHIPPED | OUTPATIENT
Start: 2021-02-12 | End: 2021-02-26 | Stop reason: SDUPTHER
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
## 2021-02-22 ENCOUNTER — OFFICE VISIT (OUTPATIENT)
Dept: INTERNAL MEDICINE CLINIC | Age: 56
End: 2021-02-22
Payer: COMMERCIAL

## 2021-02-22 VITALS
DIASTOLIC BLOOD PRESSURE: 90 MMHG | BODY MASS INDEX: 35.89 KG/M2 | SYSTOLIC BLOOD PRESSURE: 125 MMHG | OXYGEN SATURATION: 97 % | WEIGHT: 265 LBS | TEMPERATURE: 98.1 F | HEART RATE: 88 BPM | HEIGHT: 72 IN | RESPIRATION RATE: 16 BRPM

## 2021-02-22 DIAGNOSIS — I10 ESSENTIAL HYPERTENSION: Primary | ICD-10-CM

## 2021-02-22 PROCEDURE — 99213 OFFICE O/P EST LOW 20 MIN: CPT | Performed by: NURSE PRACTITIONER

## 2021-02-22 NOTE — PROGRESS NOTES
Maryana Leon  Identified pt with two pt identifiers(name and ). Chief Complaint   Patient presents with    Hypertension     Room 1B //        Reviewed record In preparation for visit and have obtained necessary documentation. 1. Have you been to the ER, urgent care clinic or hospitalized since your last visit? No     2. Have you seen or consulted any other health care providers outside of the [address] [place] [address] since your last visit? Include any pap smears or colon screening. No    Patient does not have an advance directive. Vitals reviewed with provider. Health Maintenance reviewed:     Health Maintenance Due   Topic    Hepatitis C Screening     Shingrix Vaccine Age 50> (1 of 2)          Wt Readings from Last 3 Encounters:   21 265 lb (120.2 kg)   21 264 lb (119.7 kg)   20 262 lb (118.8 kg)        Temp Readings from Last 3 Encounters:   21 98.1 °F (36.7 °C) (Oral)   21 98.2 °F (36.8 °C) (Oral)   20 98.2 °F (36.8 °C) (Oral)        BP Readings from Last 3 Encounters:   21 (!) 159/92   21 (!) 150/92   20 (!) 148/90        Pulse Readings from Last 3 Encounters:   21 88   21 81   20 80        Vitals:    21 0905   BP: (!) 159/92   Pulse: 88   Resp: 16   Temp: 98.1 °F (36.7 °C)   TempSrc: Oral   SpO2: 97%   Weight: 265 lb (120.2 kg)   Height: 6' (1.829 m)   PainSc:   0 - No pain          Learning Assessment:   :       Learning Assessment 2014   PRIMARY LEARNER Patient   PRIMARY LANGUAGE ENGLISH   LEARNER PREFERENCE PRIMARY DEMONSTRATION   ANSWERED BY patient   RELATIONSHIP SELF        Depression Screening:   :       3 most recent PHQ Screens 2021   Little interest or pleasure in doing things Not at all   Feeling down, depressed, irritable, or hopeless Not at all   Total Score PHQ 2 0        Fall Risk Assessment:   :       Fall Risk Assessment, last 12 mths 2019   Able to walk?  Yes   Fall in past 15 months? No        Abuse Screening:   :       Abuse Screening Questionnaire 9/11/2019 8/28/2017   Do you ever feel afraid of your partner? N N   Are you in a relationship with someone who physically or mentally threatens you? N N   Is it safe for you to go home?  Y Y        ADL Screening:   :       ADL Assessment 8/28/2017   Feeding yourself No Help Needed   Getting from bed to chair No Help Needed   Getting dressed No Help Needed   Bathing or showering No Help Needed   Walk across the room (includes cane/walker) No Help Needed   Using the telphone No Help Needed   Taking your medications No Help Needed   Preparing meals No Help Needed   Managing money (expenses/bills) No Help Needed   Moderately strenuous housework (laundry) No Help Needed   Shopping for personal items (toiletries/medicines) No Help Needed   Shopping for groceries No Help Needed   Driving No Help Needed   Climbing a flight of stairs No Help Needed   Getting to places beyond walking distances No Help Needed

## 2021-02-22 NOTE — PATIENT INSTRUCTIONS
High Blood Pressure: Care Instructions Overview It's normal for blood pressure to go up and down throughout the day. But if it stays up, you have high blood pressure. Another name for high blood pressure is hypertension. Despite what a lot of people think, high blood pressure usually doesn't cause headaches or make you feel dizzy or lightheaded. It usually has no symptoms. But it does increase your risk of stroke, heart attack, and other problems. You and your doctor will talk about your risks of these problems based on your blood pressure. Your doctor will give you a goal for your blood pressure. Your goal will be based on your health and your age. Lifestyle changes, such as eating healthy and being active, are always important to help lower blood pressure. You might also take medicine to reach your blood pressure goal. 
Follow-up care is a key part of your treatment and safety. Be sure to make and go to all appointments, and call your doctor if you are having problems. It's also a good idea to know your test results and keep a list of the medicines you take. How can you care for yourself at home? Medical treatment · If you stop taking your medicine, your blood pressure will go back up. You may take one or more types of medicine to lower your blood pressure. Be safe with medicines. Take your medicine exactly as prescribed. Call your doctor if you think you are having a problem with your medicine. · Talk to your doctor before you start taking aspirin every day. Aspirin can help certain people lower their risk of a heart attack or stroke. But taking aspirin isn't right for everyone, because it can cause serious bleeding. · See your doctor regularly. You may need to see the doctor more often at first or until your blood pressure comes down. · If you are taking blood pressure medicine, talk to your doctor before you take decongestants or anti-inflammatory medicine, such as ibuprofen. Some of these medicines can raise blood pressure. · Learn how to check your blood pressure at home. Lifestyle changes · Stay at a healthy weight. This is especially important if you put on weight around the waist. Losing even 10 pounds can help you lower your blood pressure. · If your doctor recommends it, get more exercise. Walking is a good choice. Bit by bit, increase the amount you walk every day. Try for at least 30 minutes on most days of the week. You also may want to swim, bike, or do other activities. · Avoid or limit alcohol. Talk to your doctor about whether you can drink any alcohol. · Try to limit how much sodium you eat to less than 2,300 milligrams (mg) a day. Your doctor may ask you to try to eat less than 1,500 mg a day. · Eat plenty of fruits (such as bananas and oranges), vegetables, legumes, whole grains, and low-fat dairy products. · Lower the amount of saturated fat in your diet. Saturated fat is found in animal products such as milk, cheese, and meat. Limiting these foods may help you lose weight and also lower your risk for heart disease. · Do not smoke. Smoking increases your risk for heart attack and stroke. If you need help quitting, talk to your doctor about stop-smoking programs and medicines. These can increase your chances of quitting for good. When should you call for help? Call  911 anytime you think you may need emergency care. This may mean having symptoms that suggest that your blood pressure is causing a serious heart or blood vessel problem. Your blood pressure may be over 180/120. For example, call 911 if: 
  · You have symptoms of a heart attack. These may include: 
? Chest pain or pressure, or a strange feeling in the chest. 
? Sweating. ? Shortness of breath. ? Nausea or vomiting. ? Pain, pressure, or a strange feeling in the back, neck, jaw, or upper belly or in one or both shoulders or arms. ? Lightheadedness or sudden weakness. ? A fast or irregular heartbeat.  
  · You have symptoms of a stroke. These may include: 
? Sudden numbness, tingling, weakness, or loss of movement in your face, arm, or leg, especially on only one side of your body. ? Sudden vision changes. ? Sudden trouble speaking. ? Sudden confusion or trouble understanding simple statements. ? Sudden problems with walking or balance. ? A sudden, severe headache that is different from past headaches.  
  · You have severe back or belly pain. Do not wait until your blood pressure comes down on its own. Get help right away. Call your doctor now or seek immediate care if: 
  · Your blood pressure is much higher than normal (such as 180/120 or higher), but you don't have symptoms.  
  · You think high blood pressure is causing symptoms, such as: 
? Severe headache. 
? Blurry vision. Watch closely for changes in your health, and be sure to contact your doctor if: 
  · Your blood pressure measures higher than your doctor recommends at least 2 times. That means the top number is higher or the bottom number is higher, or both.  
  · You think you may be having side effects from your blood pressure medicine. Where can you learn more? Go to http://dee dee-miko.info/ Enter P807 in the search box to learn more about \"High Blood Pressure: Care Instructions. \" Current as of: December 16, 2019               Content Version: 12.6 © 0498-0723 [employer]. Care instructions adapted under license by [employer] (which disclaims liability or warranty for this information). If you have questions about a medical condition or this instruction, always ask your healthcare professional. [address] any warranty or liability for your use of this information.

## 2021-02-26 DIAGNOSIS — I10 ESSENTIAL HYPERTENSION: ICD-10-CM

## 2021-02-26 RX ORDER — AMLODIPINE BESYLATE 5 MG/1
TABLET ORAL
Qty: 90 TAB | Refills: 3 | Status: SHIPPED | OUTPATIENT
Start: 2021-02-26 | End: 2021-11-03

## 2021-02-26 NOTE — TELEPHONE ENCOUNTER
[facility] on file sent a fax requesting a 90-day rx for   Requested Prescriptions     Pending Prescriptions Disp Refills    amLODIPine (NORVASC) 5 mg tablet 30 Tab 0

## 2021-02-26 NOTE — TELEPHONE ENCOUNTER
REFILL     PCP: Samra Almonte MD     Last appt: 2/22/2021   Future Appointments   Date Time Provider Maurice Mai   4/22/2021  9:00 AM Samra Almonte MD [facility] AMB        Requested Prescriptions     Pending Prescriptions Disp Refills    amLODIPine (NORVASC) 5 mg tablet 90 Tab      Sig: TAKE 1 TABLET BY MOUTH EVERY DAY

## 2021-04-06 DIAGNOSIS — N52.9 ERECTILE DYSFUNCTION, UNSPECIFIED ERECTILE DYSFUNCTION TYPE: ICD-10-CM

## 2021-04-07 RX ORDER — SILDENAFIL CITRATE 20 MG/1
TABLET ORAL
Qty: 30 TAB | Refills: 1 | Status: SHIPPED | OUTPATIENT
Start: 2021-04-07 | End: 2021-10-08

## 2021-04-08 RX ORDER — FLUTICASONE PROPIONATE 50 MCG
SPRAY, SUSPENSION (ML) NASAL
Qty: 1 BOTTLE | Refills: 1 | Status: SHIPPED | OUTPATIENT
Start: 2021-04-08 | End: 2021-04-23 | Stop reason: SDUPTHER

## 2021-04-23 DIAGNOSIS — T78.40XA ALLERGY, INITIAL ENCOUNTER: Primary | ICD-10-CM

## 2021-04-23 RX ORDER — FLUTICASONE PROPIONATE 50 MCG
SPRAY, SUSPENSION (ML) NASAL
Qty: 3 BOTTLE | Refills: 1 | Status: SHIPPED | OUTPATIENT
Start: 2021-04-23

## 2021-04-23 NOTE — TELEPHONE ENCOUNTER
PCP: Violet Cruz MD    Last appt: 4/22/2021  No future appointments.     Requested Prescriptions     Pending Prescriptions Disp Refills    fluticasone propionate (FLONASE) 50 mcg/actuation nasal spray 1 Bottle 1     Sig: SPRAY 2 SPRAYS INTO EACH NOSTRIL EVERY DAY

## 2021-04-23 NOTE — TELEPHONE ENCOUNTER
Requested Prescriptions     Pending Prescriptions Disp Refills    fluticasone propionate (FLONASE) 50 mcg/actuation nasal spray 1 Bottle 1     Pharmacy needs 90  days of RX.

## 2021-09-21 ENCOUNTER — OFFICE VISIT (OUTPATIENT)
Dept: INTERNAL MEDICINE CLINIC | Age: 56
End: 2021-09-21
Payer: COMMERCIAL

## 2021-09-21 VITALS
BODY MASS INDEX: 36.57 KG/M2 | HEART RATE: 82 BPM | OXYGEN SATURATION: 97 % | RESPIRATION RATE: 12 BRPM | WEIGHT: 270 LBS | HEIGHT: 72 IN | SYSTOLIC BLOOD PRESSURE: 150 MMHG | TEMPERATURE: 98.8 F | DIASTOLIC BLOOD PRESSURE: 82 MMHG

## 2021-09-21 DIAGNOSIS — R73.03 PREDIABETES: ICD-10-CM

## 2021-09-21 DIAGNOSIS — E66.01 SEVERE OBESITY (HCC): ICD-10-CM

## 2021-09-21 DIAGNOSIS — Z11.59 ENCOUNTER FOR HEPATITIS C SCREENING TEST FOR LOW RISK PATIENT: ICD-10-CM

## 2021-09-21 DIAGNOSIS — I10 ESSENTIAL HYPERTENSION: ICD-10-CM

## 2021-09-21 DIAGNOSIS — R05.9 COUGH: ICD-10-CM

## 2021-09-21 DIAGNOSIS — E78.00 HIGH CHOLESTEROL: Primary | ICD-10-CM

## 2021-09-21 PROCEDURE — 99214 OFFICE O/P EST MOD 30 MIN: CPT | Performed by: INTERNAL MEDICINE

## 2021-09-21 RX ORDER — LOSARTAN POTASSIUM 25 MG/1
25 TABLET ORAL DAILY
Qty: 30 TABLET | Refills: 2 | Status: SHIPPED | OUTPATIENT
Start: 2021-09-21 | End: 2021-11-03 | Stop reason: SDUPTHER

## 2021-09-21 NOTE — PROGRESS NOTES
Lauryn Mcfadden  Identified pt with two pt identifiers(name and ). Chief Complaint   Patient presents with    Blood sugar problem     room 2 b    Cholesterol Problem       Reviewed record In preparation for visit and have obtained necessary documentation. 1. Have you been to the ER, urgent care clinic or hospitalized since your last visit? No     2. Have you seen or consulted any other health care providers outside of the [address] [place] [address] since your last visit? Include any pap smears or colon screening. No    Vitals reviewed with provider.     Health Maintenance reviewed:     Health Maintenance Due   Topic    Hepatitis C Screening     Shingrix Vaccine Age 50> (1 of 2)    DTaP/Tdap/Td series (2 - Td or Tdap)    Flu Vaccine (1)    or men  Wt Readings from Last 3 Encounters:   21 270 lb (122.5 kg)   21 265 lb (120.2 kg)   21 264 lb (119.7 kg)    to  Temp Readings from Last 3 Encounters:   21 98.8 °F (37.1 °C) (Oral)   21 98.1 °F (36.7 °C) (Oral)   21 98.2 °F (36.8 °C) (Oral)   eede  BP Readings from Last 3 Encounters:   21 (!) 154/96   21 (!) 125/90   21 (!) 150/92    Nee  Pulse Readings from Last 3 Encounters:   21 82   21 88   21 81   aces  Vitals:    21 0955   BP: (!) 154/96   Pulse: 82   Resp: 12   Temp: 98.8 °F (37.1 °C)   TempSrc: Oral   SpO2: 97%   Weight: 270 lb (122.5 kg)   Height: 6' (1.829 m)   PainSc:   0 - No pain      Learning Assessment 2014   PRIMARY LEARNER Patient   PRIMARY LANGUAGE ENGLISH   LEARNER PREFERENCE PRIMARY DEMONSTRATION   ANSWERED BY patient   RELATIONSHIP SELF   ip with someone who physically   3 most recent PHQ Screens 2021   Little interest or pleasure in doing things Not at all   Feeling down, depressed, irritable, or hopeless Not at all   Total Score PHQ 2 0

## 2021-09-21 NOTE — PROGRESS NOTES
HPI  Mr. Adam Solitario is a 50y.o. year old male, he is seen today for follow up HTN, high cholesterol. Has had cough x months, with postnasal drip. No f/c or sweats, no sob or chest pain. Tried flonase, helped with postnasal drip and cough - uses bid - just started back. Thinks it helped in past, then stopped and symptoms returned. Occasional heartburn, certain foods trigger - few times per month. Hasn't checked BP in about a month. Has gained 5# in 7 mos. Hasn't been taking crestor. Stopped it b/c thought he didn't need it - no side effects     Chief Complaint   Patient presents with    Blood sugar problem     room 2 b    Cholesterol Problem        Prior to Admission medications    Medication Sig Start Date End Date Taking? Authorizing Provider   losartan (COZAAR) 25 mg tablet Take 1 Tablet by mouth daily. 9/21/21  Yes Bill Jaramillo MD   fluticasone propionate [facility]) 50 mcg/actuation nasal spray SPRAY 2 SPRAYS INTO EACH NOSTRIL EVERY DAY 4/23/21  Yes Bill Jaramillo MD   sildenafiL, pulmonary hypertension, (REVATIO) 20 mg tablet TAKE ONE TO THREE TABLETS BY MOUTH DAILY AS NEEDED 4/7/21  Yes Gadiel Graham MD   amLODIPine (NORVASC) 5 mg tablet TAKE 1 TABLET BY MOUTH EVERY DAY 2/26/21  Yes Bill Jaramillo MD   diclofenac (VOLTAREN) 1 % gel Apply 4 g to affected area four (4) times daily. Patient taking differently: Apply 4 g to affected area as needed. 9/4/18  Yes Bill Jaramillo MD   calcium carbonate (TUMS) 200 mg calcium (500 mg) Chew Take 1 Tab by mouth daily. Yes Provider, Historical   rosuvastatin (CRESTOR) 5 mg tablet Take 1 Tab by mouth nightly.   Patient not taking: Reported on 9/21/2021 12/30/20   Bill Jaramillo MD         Allergies   Allergen Reactions    Lipitor [Atorvastatin] Myalgia    Sulfa (Sulfonamide Antibiotics) Hives         REVIEW OF SYSTEMS:  Per HPI    PHYSICAL EXAM:  Visit Vitals  BP (!) 150/82   Pulse 82   Temp 98.8 °F (37.1 °C) (Oral)   Resp 12   Ht 6' (1.829 m)   Wt 270 lb (122.5 kg)   SpO2 97%   BMI 36.62 kg/m²     Constitutional: Appears well-developed and well-nourished. No distress. HENT:   Head: Normocephalic and atraumatic. Eyes: No scleral icterus. Mouth: OP clear without lesions, no pharyngeal exudate  Neck: no lad, no tm, supple   Cardiovascular: Normal S1/S2, regular rhythm. No murmurs, rubs, or gallops. Pulmonary/Chest: Effort normal and breath sounds normal. No respiratory distress. No wheezes, rhonchi, or rales. Abdomen: Soft, NT/ND, +BS, no rebound or guarding, no masses, no HSM appreciated. Ext: trace b/l pretibial edema. Neurological: Alert. Psychiatric: Normal mood and affect. Behavior is normal.     Lab Results   Component Value Date/Time    Sodium 139 01/08/2021 08:28 AM    Potassium 4.4 01/08/2021 08:28 AM    Chloride 101 01/08/2021 08:28 AM    CO2 23 01/08/2021 08:28 AM    Glucose 95 01/08/2021 08:28 AM    BUN 14 01/08/2021 08:28 AM    Creatinine 1.26 01/08/2021 08:28 AM    BUN/Creatinine ratio 11 01/08/2021 08:28 AM    GFR est AA 75 01/08/2021 08:28 AM    GFR est non-AA 65 01/08/2021 08:28 AM    Calcium 9.5 01/08/2021 08:28 AM    Bilirubin, total 0.5 01/08/2021 08:28 AM    Alk.  phosphatase 72 01/08/2021 08:28 AM    Protein, total 6.9 01/08/2021 08:28 AM    Albumin 4.5 01/08/2021 08:28 AM    A-G Ratio 1.9 01/08/2021 08:28 AM    ALT (SGPT) 19 01/08/2021 08:28 AM     Lab Results   Component Value Date/Time    Hemoglobin A1c 5.9 (H) 01/08/2021 08:28 AM    Hemoglobin A1c 5.7 (H) 03/12/2020 11:08 AM    Hemoglobin A1c 5.6 09/11/2019 09:30 AM      Lab Results   Component Value Date/Time    Cholesterol, total 197 01/08/2021 08:28 AM    HDL Cholesterol 50 01/08/2021 08:28 AM    LDL, calculated 106 (H) 01/08/2021 08:28 AM    LDL, calculated 179 (H) 03/12/2020 11:08 AM    VLDL, calculated 41 (H) 01/08/2021 08:28 AM    VLDL, calculated 48 (H) 03/12/2020 11:08 AM    Triglyceride 242 (H) 01/08/2021 08:28 AM ASSESSMENT/PLAN  Diagnoses and all orders for this visit:    1. High cholesterol  -     LIPID PANEL; Future  -     METABOLIC PANEL, COMPREHENSIVE; Future    2. Prediabetes  -     HEMOGLOBIN A1C WITH EAG; Future    3. Severe obesity ([address]    4. Essential hypertension  -     losartan (COZAAR) 25 mg tablet; Take 1 Tablet by mouth daily. 5. Cough    6. Encounter for hepatitis C screening test for low risk patient  -     HCV AB W/RFLX TO BERRY; Future      Blood pressure is not to goal on amlodipine 5 mg daily. In fact it is about the same that was prior to starting amlodipine. We will stop amlodipine and start losartan. Check lipids and restart rosuvastatin if elevated. Work on diet changes for weight loss. Suspect cough is due to allergies. Will add Zyrtec and continue Flonase which has been helping. Health Maintenance Due   Topic Date Due    Hepatitis C Screening  Never done    Shingrix Vaccine Age 50> (1 of 2) Never done    DTaP/Tdap/Td series (2 - Td or Tdap) 03/30/2021    Flu Vaccine (1) Never done        Follow-up and Dispositions    · Return in about 1 month (around 10/21/2021) for bp. Reviewed plan of care. Patient has provided input and agrees with goals. The nurse provided the patient and/or family with advanced directive information if needed and encouraged the patient to provide a copy to the office when available.

## 2021-10-07 DIAGNOSIS — N52.9 ERECTILE DYSFUNCTION, UNSPECIFIED ERECTILE DYSFUNCTION TYPE: ICD-10-CM

## 2021-10-08 RX ORDER — SILDENAFIL CITRATE 20 MG/1
TABLET ORAL
Qty: 30 TABLET | Refills: 1 | Status: SHIPPED | OUTPATIENT
Start: 2021-10-08 | End: 2022-05-17 | Stop reason: SDUPTHER

## 2021-11-02 LAB
ALBUMIN SERPL-MCNC: 4.5 G/DL (ref 3.8–4.9)
ALBUMIN/GLOB SERPL: 2 {RATIO} (ref 1.2–2.2)
ALP SERPL-CCNC: 75 IU/L (ref 44–121)
ALT SERPL-CCNC: 25 IU/L (ref 0–44)
AST SERPL-CCNC: 25 IU/L (ref 0–40)
BILIRUB SERPL-MCNC: 0.4 MG/DL (ref 0–1.2)
BUN SERPL-MCNC: 15 MG/DL (ref 6–24)
BUN/CREAT SERPL: 12 (ref 9–20)
CALCIUM SERPL-MCNC: 9.6 MG/DL (ref 8.7–10.2)
CHLORIDE SERPL-SCNC: 105 MMOL/L (ref 96–106)
CHOLEST SERPL-MCNC: 245 MG/DL (ref 100–199)
CO2 SERPL-SCNC: 24 MMOL/L (ref 20–29)
CREAT SERPL-MCNC: 1.21 MG/DL (ref 0.76–1.27)
EST. AVERAGE GLUCOSE BLD GHB EST-MCNC: 123 MG/DL
GLOBULIN SER CALC-MCNC: 2.2 G/DL (ref 1.5–4.5)
GLUCOSE SERPL-MCNC: 102 MG/DL (ref 65–99)
HBA1C MFR BLD: 5.9 % (ref 4.8–5.6)
HCV AB S/CO SERPL IA: <0.1 S/CO RATIO (ref 0–0.9)
HCV AB SERPL QL IA: NORMAL
HDLC SERPL-MCNC: 43 MG/DL
LDLC SERPL CALC-MCNC: 165 MG/DL (ref 0–99)
POTASSIUM SERPL-SCNC: 4.4 MMOL/L (ref 3.5–5.2)
PROT SERPL-MCNC: 6.7 G/DL (ref 6–8.5)
SODIUM SERPL-SCNC: 141 MMOL/L (ref 134–144)
TRIGL SERPL-MCNC: 201 MG/DL (ref 0–149)
VLDLC SERPL CALC-MCNC: 37 MG/DL (ref 5–40)

## 2021-11-03 ENCOUNTER — OFFICE VISIT (OUTPATIENT)
Dept: INTERNAL MEDICINE CLINIC | Age: 56
End: 2021-11-03
Payer: COMMERCIAL

## 2021-11-03 VITALS
SYSTOLIC BLOOD PRESSURE: 160 MMHG | DIASTOLIC BLOOD PRESSURE: 80 MMHG | HEIGHT: 72 IN | OXYGEN SATURATION: 98 % | BODY MASS INDEX: 36.27 KG/M2 | RESPIRATION RATE: 16 BRPM | WEIGHT: 267.8 LBS | TEMPERATURE: 98.2 F | HEART RATE: 67 BPM

## 2021-11-03 DIAGNOSIS — E78.00 HIGH CHOLESTEROL: ICD-10-CM

## 2021-11-03 DIAGNOSIS — I10 ESSENTIAL HYPERTENSION: Primary | ICD-10-CM

## 2021-11-03 PROCEDURE — 99213 OFFICE O/P EST LOW 20 MIN: CPT | Performed by: INTERNAL MEDICINE

## 2021-11-03 RX ORDER — LOSARTAN POTASSIUM 50 MG/1
50 TABLET ORAL DAILY
Qty: 90 TABLET | Refills: 1 | Status: SHIPPED | OUTPATIENT
Start: 2021-11-03 | End: 2022-05-12 | Stop reason: SDUPTHER

## 2021-11-03 RX ORDER — ROSUVASTATIN CALCIUM 5 MG/1
5 TABLET, COATED ORAL
Qty: 90 TABLET | Refills: 4 | Status: SHIPPED | OUTPATIENT
Start: 2021-11-03

## 2021-11-03 NOTE — PROGRESS NOTES
Brandon Dickey  Identified pt with two pt identifiers(name and ). No chief complaint on file. Reviewed record In preparation for visit and have obtained necessary documentation. 1. Have you been to the ER, urgent care clinic or hospitalized since your last visit? No     2. Have you seen or consulted any other health care providers outside of the [address] [place] [address] since your last visit? Include any pap smears or colon screening. No    Patient does not have an advance directive. Vitals reviewed with provider. Health Maintenance reviewed:     Health Maintenance Due   Topic    Shingrix Vaccine Age 50> (1 of 2)    DTaP/Tdap/Td series (2 - Td or Tdap)    Flu Vaccine (1)        Wt Readings from Last 3 Encounters:   21 270 lb (122.5 kg)   21 265 lb (120.2 kg)   21 264 lb (119.7 kg)      Temp Readings from Last 3 Encounters:   21 98.8 °F (37.1 °C) (Oral)   21 98.1 °F (36.7 °C) (Oral)   21 98.2 °F (36.8 °C) (Oral)      BP Readings from Last 3 Encounters:   21 (!) 150/82   21 (!) 125/90   21 (!) 150/92      Pulse Readings from Last 3 Encounters:   21 82   21 88   21 81      There were no vitals filed for this visit. Learning Assessment:   :     Learning Assessment 2014   PRIMARY LEARNER Patient   PRIMARY LANGUAGE ENGLISH   LEARNER PREFERENCE PRIMARY DEMONSTRATION   ANSWERED BY patient   RELATIONSHIP SELF        Depression Screening:   :     3 most recent PHQ Screens 2021   Little interest or pleasure in doing things Not at all   Feeling down, depressed, irritable, or hopeless Not at all   Total Score PHQ 2 0        Fall Risk Assessment:   :     Fall Risk Assessment, last 12 mths 2019   Able to walk? Yes   Fall in past 12 months? No        Abuse Screening:   :     Abuse Screening Questionnaire 2019   Do you ever feel afraid of your partner?  N N   Are you in a relationship with someone who physically or mentally threatens you? N N   Is it safe for you to go home?  Y Y        ADL Screening:   :     ADL Assessment 8/28/2017   Feeding yourself No Help Needed   Getting from bed to chair No Help Needed   Getting dressed No Help Needed   Bathing or showering No Help Needed   Walk across the room (includes cane/walker) No Help Needed   Using the telphone No Help Needed   Taking your medications No Help Needed   Preparing meals No Help Needed   Managing money (expenses/bills) No Help Needed   Moderately strenuous housework (laundry) No Help Needed   Shopping for personal items (toiletries/medicines) No Help Needed   Shopping for groceries No Help Needed   Driving No Help Needed   Climbing a flight of stairs No Help Needed   Getting to places beyond walking distances No Help Needed

## 2021-11-03 NOTE — PROGRESS NOTES
HPI  Mr. Randall Wall is a 49y.o. year old male, he is seen today for follow up HTN. Denies chest pain, shortness of breath, dizziness/lightheadedness, headaches, visual changes, edema. /76 when checked bp at home  Chief Complaint   Patient presents with    Blood Pressure Check     Room 2B //        Prior to Admission medications    Medication Sig Start Date End Date Taking? Authorizing Provider   rosuvastatin (CRESTOR) 5 mg tablet Take 1 Tablet by mouth nightly. 11/3/21  Yes Sanjana Canales MD   losartan (COZAAR) 50 mg tablet Take 1 Tablet by mouth daily. 11/3/21  Yes Sanjana Canales MD   sildenafiL, pulmonary hypertension, (REVATIO) 20 mg tablet TAKE ONE TO THREE TABLETS BY MOUTH EVERY DAY AS NEEDED 10/8/21  Yes Sanjana Canales MD   fluticasone propionate [facility]) 50 mcg/actuation nasal spray SPRAY 2 SPRAYS INTO EACH NOSTRIL EVERY DAY 4/23/21  Yes Sanjana Canales MD   diclofenac (VOLTAREN) 1 % gel Apply 4 g to affected area four (4) times daily. Patient taking differently: Apply 4 g to affected area as needed. 9/4/18  Yes Sanjana Canales MD   calcium carbonate (TUMS) 200 mg calcium (500 mg) Chew Take 1 Tab by mouth daily. Yes Provider, Historical   losartan (COZAAR) 25 mg tablet Take 1 Tablet by mouth daily. 9/21/21 11/3/21  Sanjana Canales MD   amLODIPine (NORVASC) 5 mg tablet TAKE 1 TABLET BY MOUTH EVERY DAY  Patient not taking: Reported on 11/3/2021 2/26/21 11/3/21  Sanjana Canales MD   rosuvastatin (CRESTOR) 5 mg tablet Take 1 Tab by mouth nightly.   Patient not taking: Reported on 11/3/2021 12/30/20 11/3/21  Sanjana Canales MD         Allergies   Allergen Reactions    Lipitor [Atorvastatin] Myalgia    Sulfa (Sulfonamide Antibiotics) Hives         REVIEW OF SYSTEMS:  Per HPI    PHYSICAL EXAM:  Visit Vitals  BP (!) 160/80   Pulse 67   Temp 98.2 °F (36.8 °C) (Oral)   Resp 16   Ht 6' (1.829 m)   Wt 267 lb 12.8 oz (121.5 kg)   SpO2 98%   BMI 36.32 kg/m² Constitutional: Appears well-developed and well-nourished. No distress. HENT:   Head: Normocephalic and atraumatic. Eyes: No scleral icterus. Cardiovascular: Normal S1/S2, regular rhythm. No murmurs, rubs, or gallops. Pulmonary/Chest: Effort normal and breath sounds normal. No respiratory distress. No wheezes, rhonchi, or rales. Ext: No edema. Neurological: Alert. Psychiatric: Normal mood and affect. Behavior is normal.     Lab Results   Component Value Date/Time    Sodium 141 11/01/2021 09:27 AM    Potassium 4.4 11/01/2021 09:27 AM    Chloride 105 11/01/2021 09:27 AM    CO2 24 11/01/2021 09:27 AM    Glucose 102 (H) 11/01/2021 09:27 AM    BUN 15 11/01/2021 09:27 AM    Creatinine 1.21 11/01/2021 09:27 AM    BUN/Creatinine ratio 12 11/01/2021 09:27 AM    GFR est AA 78 11/01/2021 09:27 AM    GFR est non-AA 67 11/01/2021 09:27 AM    Calcium 9.6 11/01/2021 09:27 AM    Bilirubin, total 0.4 11/01/2021 09:27 AM    Alk. phosphatase 75 11/01/2021 09:27 AM    Protein, total 6.7 11/01/2021 09:27 AM    Albumin 4.5 11/01/2021 09:27 AM    A-G Ratio 2.0 11/01/2021 09:27 AM    ALT (SGPT) 25 11/01/2021 09:27 AM     Lab Results   Component Value Date/Time    Hemoglobin A1c 5.9 (H) 11/01/2021 09:27 AM    Hemoglobin A1c 5.9 (H) 01/08/2021 08:28 AM    Hemoglobin A1c 5.7 (H) 03/12/2020 11:08 AM      Lab Results   Component Value Date/Time    Cholesterol, total 245 (H) 11/01/2021 09:27 AM    HDL Cholesterol 43 11/01/2021 09:27 AM    LDL, calculated 165 (H) 11/01/2021 09:27 AM    LDL, calculated 179 (H) 03/12/2020 11:08 AM    VLDL, calculated 37 11/01/2021 09:27 AM    VLDL, calculated 48 (H) 03/12/2020 11:08 AM    Triglyceride 201 (H) 11/01/2021 09:27 AM          ASSESSMENT/PLAN  Diagnoses and all orders for this visit:    1. Essential hypertension  -     losartan (COZAAR) 50 mg tablet; Take 1 Tablet by mouth daily. 2. High cholesterol  -     rosuvastatin (CRESTOR) 5 mg tablet; Take 1 Tablet by mouth nightly.       BP not at goal, increase losartan to 50 mg daily. Cholesterol 5, restart Crestor. Health Maintenance Due   Topic Date Due    Shingrix Vaccine Age 49> (1 of 2) Never done    DTaP/Tdap/Td series (2 - Td or Tdap) 03/30/2021        Follow-up and Dispositions    · Return in about 6 weeks (around 12/15/2021) for bp. Reviewed plan of care. Patient has provided input and agrees with goals. The nurse provided the patient and/or family with advanced directive information if needed and encouraged the patient to provide a copy to the office when available.

## 2021-11-03 NOTE — PROGRESS NOTES
Patrick Carbajal  Identified pt with two pt identifiers(name and ). Chief Complaint   Patient presents with    Blood Pressure Check     Room 2B //       Reviewed record In preparation for visit and have obtained necessary documentation. 1. Have you been to the ER, urgent care clinic or hospitalized since your last visit? No     2. Have you seen or consulted any other health care providers outside of the [address] [place] [address] since your last visit? Include any pap smears or colon screening. No    Patient does not have an advance directive. Vitals reviewed with provider.     Health Maintenance reviewed:     Health Maintenance Due   Topic    Shingrix Vaccine Age 50> (1 of 2)    DTaP/Tdap/Td series (2 - Td or Tdap)          Wt Readings from Last 3 Encounters:   21 267 lb 12.8 oz (121.5 kg)   21 270 lb (122.5 kg)   21 265 lb (120.2 kg)        Temp Readings from Last 3 Encounters:   21 98.2 °F (36.8 °C) (Oral)   21 98.8 °F (37.1 °C) (Oral)   21 98.1 °F (36.7 °C) (Oral)        BP Readings from Last 3 Encounters:   21 (!) 167/98   21 (!) 150/82   21 (!) 125/90        Pulse Readings from Last 3 Encounters:   21 67   21 82   21 88        Vitals:    21 0902   BP: (!) 167/98   Pulse: 67   Resp: 16   Temp: 98.2 °F (36.8 °C)   TempSrc: Oral   SpO2: 98%   Weight: 267 lb 12.8 oz (121.5 kg)   Height: 6' (1.829 m)   PainSc:   0 - No pain          Learning Assessment:   :       Learning Assessment 2014   PRIMARY LEARNER Patient   PRIMARY LANGUAGE ENGLISH   LEARNER PREFERENCE PRIMARY DEMONSTRATION   ANSWERED BY patient   RELATIONSHIP SELF        Depression Screening:   :       3 most recent PHQ Screens 11/3/2021   Little interest or pleasure in doing things Not at all   Feeling down, depressed, irritable, or hopeless Not at all   Total Score PHQ 2 0        Fall Risk Assessment:   :       Fall Risk Assessment, last 12 mths 2019 Able to walk? Yes   Fall in past 12 months? No        Abuse Screening:   :       Abuse Screening Questionnaire 9/11/2019 8/28/2017   Do you ever feel afraid of your partner? N N   Are you in a relationship with someone who physically or mentally threatens you? N N   Is it safe for you to go home?  Y Y        ADL Screening:   :       ADL Assessment 8/28/2017   Feeding yourself No Help Needed   Getting from bed to chair No Help Needed   Getting dressed No Help Needed   Bathing or showering No Help Needed   Walk across the room (includes cane/walker) No Help Needed   Using the telphone No Help Needed   Taking your medications No Help Needed   Preparing meals No Help Needed   Managing money (expenses/bills) No Help Needed   Moderately strenuous housework (laundry) No Help Needed   Shopping for personal items (toiletries/medicines) No Help Needed   Shopping for groceries No Help Needed   Driving No Help Needed   Climbing a flight of stairs No Help Needed   Getting to places beyond walking distances No Help Needed

## 2022-01-05 ENCOUNTER — OFFICE VISIT (OUTPATIENT)
Dept: INTERNAL MEDICINE CLINIC | Age: 57
End: 2022-01-05
Payer: COMMERCIAL

## 2022-01-05 VITALS
TEMPERATURE: 98.2 F | RESPIRATION RATE: 18 BRPM | HEIGHT: 71 IN | SYSTOLIC BLOOD PRESSURE: 144 MMHG | OXYGEN SATURATION: 97 % | HEART RATE: 86 BPM | WEIGHT: 266.2 LBS | DIASTOLIC BLOOD PRESSURE: 86 MMHG | BODY MASS INDEX: 37.27 KG/M2

## 2022-01-05 DIAGNOSIS — E66.01 SEVERE OBESITY (BMI 35.0-35.9 WITH COMORBIDITY) (HCC): ICD-10-CM

## 2022-01-05 DIAGNOSIS — I10 ESSENTIAL HYPERTENSION: ICD-10-CM

## 2022-01-05 DIAGNOSIS — E78.00 HIGH CHOLESTEROL: Primary | ICD-10-CM

## 2022-01-05 PROCEDURE — 99213 OFFICE O/P EST LOW 20 MIN: CPT | Performed by: INTERNAL MEDICINE

## 2022-01-05 NOTE — PROGRESS NOTES
HPI  Mr. Malick Brown is a 49y.o. year old male, he is seen today for follow up HTN. BP this morning was 137/83  Forgot bp meds this morning and normally doesn't forgot. Checks BP about every 3 days, normally 130-140s/80s. No chest pain, sob, dizziness, weakness, lightheadedness. Chief Complaint   Patient presents with    Follow-up     6 weeks bp        Prior to Admission medications    Medication Sig Start Date End Date Taking? Authorizing Provider   rosuvastatin (CRESTOR) 5 mg tablet Take 1 Tablet by mouth nightly. 11/3/21  Yes Alba Bunn MD   sildenafiL, pulmonary hypertension, (REVATIO) 20 mg tablet TAKE ONE TO THREE TABLETS BY MOUTH EVERY DAY AS NEEDED 10/8/21  Yes Alba Bunn MD   fluticasone propionate [facility]) 50 mcg/actuation nasal spray SPRAY 2 SPRAYS INTO EACH NOSTRIL EVERY DAY 4/23/21  Yes Alba Bunn MD   diclofenac (VOLTAREN) 1 % gel Apply 4 g to affected area four (4) times daily. Patient taking differently: Apply 4 g to affected area as needed. 9/4/18  Yes Alba Bunn MD   calcium carbonate (TUMS) 200 mg calcium (500 mg) Chew Take 1 Tab by mouth daily. Yes Provider, Historical   losartan (COZAAR) 50 mg tablet Take 1 Tablet by mouth daily. 11/3/21   Alba Bunn MD         Allergies   Allergen Reactions    Lipitor [Atorvastatin] Myalgia    Sulfa (Sulfonamide Antibiotics) Hives         REVIEW OF SYSTEMS:  Per HPI    PHYSICAL EXAM:  Visit Vitals  BP (!) 144/86   Pulse 86   Temp 98.2 °F (36.8 °C) (Oral)   Resp 18   Ht 5' 11\" (1.803 m)   Wt 266 lb 3.2 oz (120.7 kg)   SpO2 97%   BMI 37.13 kg/m²     Constitutional: Appears well-developed and well-nourished. No distress. HENT:   Head: Normocephalic and atraumatic. Eyes: No scleral icterus. Cardiovascular: Normal S1/S2, regular rhythm. No murmurs, rubs, or gallops. Pulmonary/Chest: Effort normal and breath sounds normal. No respiratory distress. No wheezes, rhonchi, or rales.    Ext: No edema. Neurological: Alert. Psychiatric: Normal mood and affect. Behavior is normal.     Lab Results   Component Value Date/Time    Sodium 141 11/01/2021 09:27 AM    Potassium 4.4 11/01/2021 09:27 AM    Chloride 105 11/01/2021 09:27 AM    CO2 24 11/01/2021 09:27 AM    Glucose 102 (H) 11/01/2021 09:27 AM    BUN 15 11/01/2021 09:27 AM    Creatinine 1.21 11/01/2021 09:27 AM    BUN/Creatinine ratio 12 11/01/2021 09:27 AM    GFR est AA 78 11/01/2021 09:27 AM    GFR est non-AA 67 11/01/2021 09:27 AM    Calcium 9.6 11/01/2021 09:27 AM    Bilirubin, total 0.4 11/01/2021 09:27 AM    Alk. phosphatase 75 11/01/2021 09:27 AM    Protein, total 6.7 11/01/2021 09:27 AM    Albumin 4.5 11/01/2021 09:27 AM    A-G Ratio 2.0 11/01/2021 09:27 AM    ALT (SGPT) 25 11/01/2021 09:27 AM     Lab Results   Component Value Date/Time    Hemoglobin A1c 5.9 (H) 11/01/2021 09:27 AM    Hemoglobin A1c 5.9 (H) 01/08/2021 08:28 AM    Hemoglobin A1c 5.7 (H) 03/12/2020 11:08 AM      Lab Results   Component Value Date/Time    Cholesterol, total 245 (H) 11/01/2021 09:27 AM    HDL Cholesterol 43 11/01/2021 09:27 AM    LDL, calculated 165 (H) 11/01/2021 09:27 AM    LDL, calculated 179 (H) 03/12/2020 11:08 AM    VLDL, calculated 37 11/01/2021 09:27 AM    VLDL, calculated 48 (H) 03/12/2020 11:08 AM    Triglyceride 201 (H) 11/01/2021 09:27 AM          ASSESSMENT/PLAN  Diagnoses and all orders for this visit:    1. High cholesterol  -     METABOLIC PANEL, COMPREHENSIVE; Future  -     LIPID PANEL; Future    2. Essential hypertension  -     METABOLIC PANEL, COMPREHENSIVE; Future  -     LIPID PANEL; Future    3.  Severe obesity (BMI 35.0-35.9 with comorbidity) ([address]    suspect BP at goal with meds - continue current medications   Check labs prior to next visit  recommend 30 min exercise 5 days per week, portion control and healthful diet for weight      Health Maintenance Due   Topic Date Due    Shingrix Vaccine Age 50> (1 of 2) Never done    DTaP/Tdap/Td series (2 - Td or Tdap) 03/30/2021    COVID-19 Vaccine (2 - Booster for Geoffrey series) 07/27/2021        Follow-up and Dispositions    · Return for 3-4 mos BP, cholesterol - labs prior. Reviewed plan of care. Patient has provided input and agrees with goals. The nurse provided the patient and/or family with advanced directive information if needed and encouraged the patient to provide a copy to the office when available.

## 2022-01-05 NOTE — PROGRESS NOTES
Rm    Chief Complaint   Patient presents with    Follow-up     6 weeks bp        Visit Vitals  BP (!) 154/82 (BP 1 Location: Left upper arm, BP Patient Position: Sitting, BP Cuff Size: Large adult)   Pulse 86   Temp 98.2 °F (36.8 °C) (Oral)   Resp 18   Ht 5' 11\" (1.803 m)   Wt 266 lb 3.2 oz (120.7 kg)   SpO2 97%   BMI 37.13 kg/m²      Pt states he did not take medication prior to appointment. 1. Have you been to the ER, urgent care clinic since your last visit? Hospitalized since your last visit? No    2. Have you seen or consulted any other health care providers outside of the [address] [place] [address] since your last visit? Include any pap smears or colon screening. No     Health Maintenance Due   Topic Date Due    Shingrix Vaccine Age 49> (1 of 2) Never done    DTaP/Tdap/Td series (2 - Td or Tdap) 03/30/2021    COVID-19 Vaccine (2 - Booster for Geoffrey series) 07/27/2021        3 most recent PHQ Screens 1/5/2022   Little interest or pleasure in doing things Not at all   Feeling down, depressed, irritable, or hopeless Not at all   Total Score PHQ 2 0        Fall Risk Assessment, last 12 mths 9/11/2019   Able to walk? Yes   Fall in past 12 months?  No       Learning Assessment 5/2/2014   PRIMARY LEARNER Patient   PRIMARY LANGUAGE ENGLISH   LEARNER PREFERENCE PRIMARY DEMONSTRATION   ANSWERED BY patient   RELATIONSHIP SELF

## 2022-02-08 ENCOUNTER — TELEPHONE (OUTPATIENT)
Dept: INTERNAL MEDICINE CLINIC | Age: 57
End: 2022-02-08

## 2022-02-08 NOTE — TELEPHONE ENCOUNTER
I called the patient and verified them by name and date of birth. He is experiencing some mucus, drainage and a cough. Has been taking Robitussin DM Cough Syrup, Flonase & Zyrtec. I reccommended nasal saline and mucinex. He tried Mucinex before the Robutussin.

## 2022-02-08 NOTE — TELEPHONE ENCOUNTER
Pt called and wanted to know if there was another OTC medication that he could take that would help with a cough and mucus he has

## 2022-03-18 PROBLEM — E66.01 SEVERE OBESITY (HCC): Status: ACTIVE | Noted: 2019-03-04

## 2022-05-12 ENCOUNTER — OFFICE VISIT (OUTPATIENT)
Dept: INTERNAL MEDICINE CLINIC | Age: 57
End: 2022-05-12
Payer: COMMERCIAL

## 2022-05-12 VITALS
HEIGHT: 72 IN | SYSTOLIC BLOOD PRESSURE: 140 MMHG | HEART RATE: 78 BPM | OXYGEN SATURATION: 97 % | DIASTOLIC BLOOD PRESSURE: 82 MMHG | BODY MASS INDEX: 34.92 KG/M2 | WEIGHT: 257.8 LBS | TEMPERATURE: 98.3 F | RESPIRATION RATE: 16 BRPM

## 2022-05-12 DIAGNOSIS — I10 ESSENTIAL HYPERTENSION: Primary | ICD-10-CM

## 2022-05-12 DIAGNOSIS — R25.2 MUSCLE CRAMPS: ICD-10-CM

## 2022-05-12 DIAGNOSIS — E78.00 HIGH CHOLESTEROL: ICD-10-CM

## 2022-05-12 DIAGNOSIS — M25.572 ACUTE LEFT ANKLE PAIN: ICD-10-CM

## 2022-05-12 DIAGNOSIS — Z12.5 PROSTATE CANCER SCREENING: ICD-10-CM

## 2022-05-12 DIAGNOSIS — R73.03 PREDIABETES: ICD-10-CM

## 2022-05-12 PROCEDURE — 99214 OFFICE O/P EST MOD 30 MIN: CPT | Performed by: INTERNAL MEDICINE

## 2022-05-12 RX ORDER — LOSARTAN POTASSIUM 100 MG/1
100 TABLET ORAL DAILY
Qty: 90 TABLET | Refills: 1 | Status: SHIPPED | OUTPATIENT
Start: 2022-05-12

## 2022-05-12 RX ORDER — DICLOFENAC SODIUM 10 MG/G
4 GEL TOPICAL
Qty: 100 G | Refills: 2 | Status: SHIPPED | OUTPATIENT
Start: 2022-05-12

## 2022-05-12 NOTE — PROGRESS NOTES
Kesha March  Identified pt with two pt identifiers(name and ). Chief Complaint   Patient presents with    Blood Pressure Check     Room 2A //     Cholesterol Problem       Reviewed record In preparation for visit and have obtained necessary documentation. 1. Have you been to the ER, urgent care clinic or hospitalized since your last visit? No     2. Have you seen or consulted any other health care providers outside of the [address] [place] [address] since your last visit? Include any pap smears or colon screening. No    Patient does not have an advance directive. Vitals reviewed with provider. Health Maintenance reviewed: There are no preventive care reminders to display for this patient.        Wt Readings from Last 3 Encounters:   22 257 lb 12.8 oz (116.9 kg)   22 266 lb 3.2 oz (120.7 kg)   21 267 lb 12.8 oz (121.5 kg)        Temp Readings from Last 3 Encounters:   22 98.3 °F (36.8 °C) (Oral)   22 98.2 °F (36.8 °C) (Oral)   21 98.2 °F (36.8 °C) (Oral)        BP Readings from Last 3 Encounters:   22 (!) 158/95   22 (!) 144/86   21 (!) 160/80        Pulse Readings from Last 3 Encounters:   22 78   22 86   21 67        Vitals:    22 0836   BP: (!) 158/95   Pulse: 78   Resp: 16   Temp: 98.3 °F (36.8 °C)   TempSrc: Oral   SpO2: 97%   Weight: 257 lb 12.8 oz (116.9 kg)   Height: 6' (1.829 m)   PainSc:   0 - No pain          Learning Assessment:   :       Learning Assessment 2014   PRIMARY LEARNER Patient   PRIMARY LANGUAGE ENGLISH   LEARNER PREFERENCE PRIMARY DEMONSTRATION   ANSWERED BY patient   RELATIONSHIP SELF        Depression Screening:   :       3 most recent PHQ Screens 2022   Little interest or pleasure in doing things Not at all   Feeling down, depressed, irritable, or hopeless Not at all   Total Score PHQ 2 0        Fall Risk Assessment:   :       Fall Risk Assessment, last 12 mths 2019   Able to walk? Yes   Fall in past 12 months? No        Abuse Screening:   :       Abuse Screening Questionnaire 9/11/2019 8/28/2017   Do you ever feel afraid of your partner? N N   Are you in a relationship with someone who physically or mentally threatens you? N N   Is it safe for you to go home?  Y Y        ADL Screening:   :       ADL Assessment 8/28/2017   Feeding yourself No Help Needed   Getting from bed to chair No Help Needed   Getting dressed No Help Needed   Bathing or showering No Help Needed   Walk across the room (includes cane/walker) No Help Needed   Using the telphone No Help Needed   Taking your medications No Help Needed   Preparing meals No Help Needed   Managing money (expenses/bills) No Help Needed   Moderately strenuous housework (laundry) No Help Needed   Shopping for personal items (toiletries/medicines) No Help Needed   Shopping for groceries No Help Needed   Driving No Help Needed   Climbing a flight of stairs No Help Needed   Getting to places beyond walking distances No Help Needed

## 2022-05-12 NOTE — PROGRESS NOTES
HPI  Mr. Adam Krause is a 49y.o. year old male, he is seen today for follow up HTN, cholesterol. No chest pain, sob, dizziness, weakness, lightheadedness. BP at home has been about 147/84 - this has been usual range. Has lost 9# since last visit - more active at work. Is eating more healthful foods. More vegetables and fruits, more grilled foods. Eating less fast food-usually packing lunch for work. Has noticed intermittent muscle cramps different areas last few months. Has been to eye doctor, normal exam other than benign subconjunctival hemorrhage. Chief Complaint   Patient presents with    Blood Pressure Check     Room 2A //     Cholesterol Problem        Prior to Admission medications    Medication Sig Start Date End Date Taking? Authorizing Provider   diclofenac (VOLTAREN) 1 % gel Apply 4 g to affected area every six (6) hours as needed for Pain. 5/12/22  Yes Magnus Dela Cruz MD   losartan (COZAAR) 100 mg tablet Take 1 Tablet by mouth daily. 5/12/22  Yes Magnus Dela Cruz MD   rosuvastatin (CRESTOR) 5 mg tablet Take 1 Tablet by mouth nightly. 11/3/21  Yes Magnus Dela Cruz MD   sildenafiL, pulmonary hypertension, (REVATIO) 20 mg tablet TAKE ONE TO THREE TABLETS BY MOUTH EVERY DAY AS NEEDED 10/8/21  Yes Magnus Dela Cruz MD   fluticasone propionate (FLONASE) 50 mcg/actuation nasal spray SPRAY 2 SPRAYS INTO EACH NOSTRIL EVERY DAY 4/23/21  Yes Magnus Dela Cruz MD   calcium carbonate (TUMS) 200 mg calcium (500 mg) Chew Take 1 Tab by mouth daily. Yes Provider, Historical   losartan (COZAAR) 50 mg tablet Take 1 Tablet by mouth daily. 11/3/21 5/12/22  Magnus Dela Cruz MD   diclofenac (VOLTAREN) 1 % gel Apply 4 g to affected area four (4) times daily. Patient taking differently: Apply 4 g to affected area as needed.  9/4/18 5/12/22  Magnus Dela Cruz MD         Allergies   Allergen Reactions    Lipitor [Atorvastatin] Myalgia    Sulfa (Sulfonamide Antibiotics) Hives         REVIEW OF SYSTEMS:  Per HPI    PHYSICAL EXAM:  Visit Vitals  BP (!) 140/82   Pulse 78   Temp 98.3 °F (36.8 °C) (Oral)   Resp 16   Ht 6' (1.829 m)   Wt 257 lb 12.8 oz (116.9 kg)   SpO2 97%   BMI 34.96 kg/m²     Constitutional: Appears well-developed and well-nourished. No distress. HENT:   Head: Normocephalic and atraumatic. Eyes: No scleral icterus. Cardiovascular: Normal S1/S2, regular rhythm. No murmurs, rubs, or gallops. Pulmonary/Chest: Effort normal and breath sounds normal. No respiratory distress. No wheezes, rhonchi, or rales. Ext: No edema. Neurological: Alert. Psychiatric: Normal mood and affect. Behavior is normal.     Lab Results   Component Value Date/Time    Sodium 141 11/01/2021 09:27 AM    Potassium 4.4 11/01/2021 09:27 AM    Chloride 105 11/01/2021 09:27 AM    CO2 24 11/01/2021 09:27 AM    Glucose 102 (H) 11/01/2021 09:27 AM    BUN 15 11/01/2021 09:27 AM    Creatinine 1.21 11/01/2021 09:27 AM    BUN/Creatinine ratio 12 11/01/2021 09:27 AM    GFR est AA 78 11/01/2021 09:27 AM    GFR est non-AA 67 11/01/2021 09:27 AM    Calcium 9.6 11/01/2021 09:27 AM    Bilirubin, total 0.4 11/01/2021 09:27 AM    Alk. phosphatase 75 11/01/2021 09:27 AM    Protein, total 6.7 11/01/2021 09:27 AM    Albumin 4.5 11/01/2021 09:27 AM    A-G Ratio 2.0 11/01/2021 09:27 AM    ALT (SGPT) 25 11/01/2021 09:27 AM     Lab Results   Component Value Date/Time    Hemoglobin A1c 5.9 (H) 11/01/2021 09:27 AM    Hemoglobin A1c 5.9 (H) 01/08/2021 08:28 AM    Hemoglobin A1c 5.7 (H) 03/12/2020 11:08 AM      Lab Results   Component Value Date/Time    Cholesterol, total 245 (H) 11/01/2021 09:27 AM    HDL Cholesterol 43 11/01/2021 09:27 AM    LDL, calculated 165 (H) 11/01/2021 09:27 AM    LDL, calculated 179 (H) 03/12/2020 11:08 AM    VLDL, calculated 37 11/01/2021 09:27 AM    VLDL, calculated 48 (H) 03/12/2020 11:08 AM    Triglyceride 201 (H) 11/01/2021 09:27 AM          ASSESSMENT/PLAN  Diagnoses and all orders for this visit:    1. Essential hypertension  -     CBC WITH AUTOMATED DIFF; Future  -     losartan (COZAAR) 100 mg tablet; Take 1 Tablet by mouth daily. 2. Acute left ankle pain  -     diclofenac (VOLTAREN) 1 % gel; Apply 4 g to affected area every six (6) hours as needed for Pain. 3. High cholesterol  -     LIPID PANEL; Future  -     METABOLIC PANEL, COMPREHENSIVE; Future    4. Prediabetes  -     HEMOGLOBIN A1C WITH EAG; Future    5. Prostate cancer screening  -     PSA SCREENING (SCREENING); Future    6. Muscle cramps  -     MAGNESIUM; Future      BP not to goal - increase losartan to 100mg daily  Get lipids - on crestor  Suspect muscle cramps due to crestor - add vit D daily which may help  Check potassium and magnesium  Check a1c  Continue weight loss through diet and exercise    There are no preventive care reminders to display for this patient. Follow-up and Dispositions    · Return for 2-3 months BP. Reviewed plan of care. Patient has provided input and agrees with goals. The nurse provided the patient and/or family with advanced directive information if needed and encouraged the patient to provide a copy to the office when available.

## 2022-05-13 LAB
ALBUMIN SERPL-MCNC: 4.7 G/DL (ref 3.8–4.9)
ALBUMIN/GLOB SERPL: 1.8 {RATIO} (ref 1.2–2.2)
ALP SERPL-CCNC: 101 IU/L (ref 44–121)
ALT SERPL-CCNC: 23 IU/L (ref 0–44)
AST SERPL-CCNC: 21 IU/L (ref 0–40)
BASOPHILS # BLD AUTO: 0 X10E3/UL (ref 0–0.2)
BASOPHILS NFR BLD AUTO: 0 %
BILIRUB SERPL-MCNC: 0.5 MG/DL (ref 0–1.2)
BUN SERPL-MCNC: 14 MG/DL (ref 6–24)
BUN/CREAT SERPL: 12 (ref 9–20)
CALCIUM SERPL-MCNC: 9.2 MG/DL (ref 8.7–10.2)
CHLORIDE SERPL-SCNC: 98 MMOL/L (ref 96–106)
CHOLEST SERPL-MCNC: 176 MG/DL (ref 100–199)
CO2 SERPL-SCNC: 24 MMOL/L (ref 20–29)
CREAT SERPL-MCNC: 1.2 MG/DL (ref 0.76–1.27)
EGFR: 72 ML/MIN/1.73
EOSINOPHIL # BLD AUTO: 0.2 X10E3/UL (ref 0–0.4)
EOSINOPHIL NFR BLD AUTO: 2 %
ERYTHROCYTE [DISTWIDTH] IN BLOOD BY AUTOMATED COUNT: 12.8 % (ref 11.6–15.4)
EST. AVERAGE GLUCOSE BLD GHB EST-MCNC: 131 MG/DL
GLOBULIN SER CALC-MCNC: 2.6 G/DL (ref 1.5–4.5)
GLUCOSE SERPL-MCNC: 111 MG/DL (ref 65–99)
HBA1C MFR BLD: 6.2 % (ref 4.8–5.6)
HCT VFR BLD AUTO: 44.7 % (ref 37.5–51)
HDLC SERPL-MCNC: 42 MG/DL
HGB BLD-MCNC: 15.3 G/DL (ref 13–17.7)
IMM GRANULOCYTES # BLD AUTO: 0 X10E3/UL (ref 0–0.1)
IMM GRANULOCYTES NFR BLD AUTO: 1 %
LDLC SERPL CALC-MCNC: 98 MG/DL (ref 0–99)
LYMPHOCYTES # BLD AUTO: 1.3 X10E3/UL (ref 0.7–3.1)
LYMPHOCYTES NFR BLD AUTO: 18 %
MAGNESIUM SERPL-MCNC: 2.2 MG/DL (ref 1.6–2.3)
MCH RBC QN AUTO: 31.9 PG (ref 26.6–33)
MCHC RBC AUTO-ENTMCNC: 34.2 G/DL (ref 31.5–35.7)
MCV RBC AUTO: 93 FL (ref 79–97)
MONOCYTES # BLD AUTO: 0.8 X10E3/UL (ref 0.1–0.9)
MONOCYTES NFR BLD AUTO: 11 %
NEUTROPHILS # BLD AUTO: 5.1 X10E3/UL (ref 1.4–7)
NEUTROPHILS NFR BLD AUTO: 68 %
PLATELET # BLD AUTO: 187 X10E3/UL (ref 150–450)
POTASSIUM SERPL-SCNC: 4.3 MMOL/L (ref 3.5–5.2)
PROT SERPL-MCNC: 7.3 G/DL (ref 6–8.5)
PSA SERPL-MCNC: 0.8 NG/ML (ref 0–4)
RBC # BLD AUTO: 4.8 X10E6/UL (ref 4.14–5.8)
SODIUM SERPL-SCNC: 137 MMOL/L (ref 134–144)
TRIGL SERPL-MCNC: 207 MG/DL (ref 0–149)
VLDLC SERPL CALC-MCNC: 36 MG/DL (ref 5–40)
WBC # BLD AUTO: 7.5 X10E3/UL (ref 3.4–10.8)

## 2022-05-15 DIAGNOSIS — I10 ESSENTIAL HYPERTENSION: ICD-10-CM

## 2022-05-15 RX ORDER — LOSARTAN POTASSIUM 50 MG/1
TABLET ORAL
Qty: 90 TABLET | Refills: 1 | OUTPATIENT
Start: 2022-05-15

## 2022-05-17 DIAGNOSIS — N52.9 ERECTILE DYSFUNCTION, UNSPECIFIED ERECTILE DYSFUNCTION TYPE: ICD-10-CM

## 2022-05-17 RX ORDER — SILDENAFIL CITRATE 20 MG/1
TABLET ORAL
Qty: 30 TABLET | Refills: 1 | Status: SHIPPED | OUTPATIENT
Start: 2022-05-17 | End: 2022-09-09

## 2022-05-17 NOTE — TELEPHONE ENCOUNTER
Requested Prescriptions     Pending Prescriptions Disp Refills    sildenafiL (REVATIO) 20 mg tablet 30 Tablet 1

## 2022-09-09 ENCOUNTER — OFFICE VISIT (OUTPATIENT)
Dept: INTERNAL MEDICINE CLINIC | Age: 57
End: 2022-09-09
Payer: COMMERCIAL

## 2022-09-09 VITALS
TEMPERATURE: 97.9 F | HEIGHT: 71 IN | OXYGEN SATURATION: 98 % | SYSTOLIC BLOOD PRESSURE: 138 MMHG | RESPIRATION RATE: 16 BRPM | BODY MASS INDEX: 36.99 KG/M2 | HEART RATE: 69 BPM | DIASTOLIC BLOOD PRESSURE: 86 MMHG | WEIGHT: 264.2 LBS

## 2022-09-09 DIAGNOSIS — N52.9 ERECTILE DYSFUNCTION, UNSPECIFIED ERECTILE DYSFUNCTION TYPE: ICD-10-CM

## 2022-09-09 DIAGNOSIS — R05.9 COUGH: ICD-10-CM

## 2022-09-09 DIAGNOSIS — I10 ESSENTIAL HYPERTENSION: Primary | ICD-10-CM

## 2022-09-09 PROCEDURE — 99214 OFFICE O/P EST MOD 30 MIN: CPT | Performed by: INTERNAL MEDICINE

## 2022-09-09 RX ORDER — SILDENAFIL CITRATE 20 MG/1
TABLET ORAL
Qty: 30 TABLET | Refills: 1 | Status: SHIPPED | OUTPATIENT
Start: 2022-09-09

## 2022-09-09 NOTE — PROGRESS NOTES
HPI  Mr. Jane Blue is a 49y.o. year old male, he is seen today for follow up HTN. Last visit increased losartan to 100 mg daily. Last week BP was better 709E systolic. No chest pain, sob, dizziness, weakness, HA. Has had cough for \"a while\" - any time of day, sometimes productive. No tickle in throat. +postnasal drip. Not consistent with zyrtec or flonase daily. Cough improves when postnasal drip improves but makes his throat dry. No weight loss, no f/c or sweats. Rare heartburn or indigestion. Not walking as much at work due to new position at work, might be reason for weight gain. Chief Complaint   Patient presents with    Blood Pressure Check     Room 2A //         Prior to Admission medications    Medication Sig Start Date End Date Taking? Authorizing Provider   sildenafiL (REVATIO) 20 mg tablet TAKE ONE TO THREE TABLETS BY MOUTH EVERY DAY AS NEEDED 5/17/22 9/9/22 Yes Maty Varma MD   diclofenac (VOLTAREN) 1 % gel Apply 4 g to affected area every six (6) hours as needed for Pain. 5/12/22  Yes Maty Varma MD   losartan (COZAAR) 100 mg tablet Take 1 Tablet by mouth daily. 5/12/22  Yes Maty Varma MD   rosuvastatin (CRESTOR) 5 mg tablet Take 1 Tablet by mouth nightly. 11/3/21  Yes Maty Varma MD   fluticasone propionate [facility]) 50 mcg/actuation nasal spray SPRAY 2 SPRAYS INTO EACH NOSTRIL EVERY DAY 4/23/21  Yes Maty Varma MD   calcium carbonate (TUMS) 200 mg calcium (500 mg) Chew Take 1 Tab by mouth daily.    Yes Provider, Historical   sildenafiL (REVATIO) 20 mg tablet TAKE ONE TO THREE TABLETS BY MOUTH DAILY AS NEEDED 9/9/22   Maty Varma MD         Allergies   Allergen Reactions    Lipitor [Atorvastatin] Myalgia    Sulfa (Sulfonamide Antibiotics) Hives         REVIEW OF SYSTEMS:  Per HPI    PHYSICAL EXAM:  Visit Vitals  /86   Pulse 69   Temp 97.9 °F (36.6 °C) (Oral)   Resp 16   Ht 5' 11\" (1.803 m)   Wt 264 lb 3.2 oz (119.8 kg)   SpO2 98%   BMI 36.85 kg/m²     Constitutional: Appears well-developed and well-nourished. No distress. HENT:   Head: Normocephalic and atraumatic. Eyes: No scleral icterus. Nose: nasal mucosa congested, inflamed, erythematous  Mouth: OP clear without lesions, no pharyngeal exudate  Neck: no lad, no tm, supple   Cardiovascular: Normal S1/S2, regular rhythm. No murmurs, rubs, or gallops. Pulmonary/Chest: Effort normal and breath sounds normal. No respiratory distress. No wheezes, rhonchi, or rales. Ext: No edema. Neurological: Alert. Psychiatric: Normal mood and affect. Behavior is normal.     Lab Results   Component Value Date/Time    Sodium 137 05/12/2022 10:02 AM    Potassium 4.3 05/12/2022 10:02 AM    Chloride 98 05/12/2022 10:02 AM    CO2 24 05/12/2022 10:02 AM    Glucose 111 (H) 05/12/2022 10:02 AM    BUN 14 05/12/2022 10:02 AM    Creatinine 1.20 05/12/2022 10:02 AM    BUN/Creatinine ratio 12 05/12/2022 10:02 AM    GFR est AA 78 11/01/2021 09:27 AM    GFR est non-AA 67 11/01/2021 09:27 AM    Calcium 9.2 05/12/2022 10:02 AM    Bilirubin, total 0.5 05/12/2022 10:02 AM    Alk. phosphatase 101 05/12/2022 10:02 AM    Protein, total 7.3 05/12/2022 10:02 AM    Albumin 4.7 05/12/2022 10:02 AM    A-G Ratio 1.8 05/12/2022 10:02 AM    ALT (SGPT) 23 05/12/2022 10:02 AM     Lab Results   Component Value Date/Time    Hemoglobin A1c 6.2 (H) 05/12/2022 10:02 AM    Hemoglobin A1c 5.9 (H) 11/01/2021 09:27 AM    Hemoglobin A1c 5.9 (H) 01/08/2021 08:28 AM      Lab Results   Component Value Date/Time    Cholesterol, total 176 05/12/2022 10:02 AM    HDL Cholesterol 42 05/12/2022 10:02 AM    LDL, calculated 98 05/12/2022 10:02 AM    LDL, calculated 179 (H) 03/12/2020 11:08 AM    VLDL, calculated 36 05/12/2022 10:02 AM    VLDL, calculated 48 (H) 03/12/2020 11:08 AM    Triglyceride 207 (H) 05/12/2022 10:02 AM          ASSESSMENT/PLAN  Diagnoses and all orders for this visit:    1. Essential hypertension    2.  Cough    BP improved - continue losartan 100mg daily  Suspect cough due to allergies, postnasal drip - add flonase daily - if doesn't resolve get cxr, consider gerd and would try famotidine    There are no preventive care reminders to display for this patient. Follow-up and Dispositions    Return in about 6 months (around 3/9/2023), or if symptoms worsen or fail to improve, for BP. Reviewed plan of care. Patient has provided input and agrees with goals. The nurse provided the patient and/or family with advanced directive information if needed and encouraged the patient to provide a copy to the office when available.

## 2022-09-09 NOTE — PROGRESS NOTES
Perry Shelton  Identified pt with two pt identifiers(name and ). Chief Complaint   Patient presents with    Blood Pressure Check     Room 2A //        Reviewed record In preparation for visit and have obtained necessary documentation. 1. Have you been to the ER, urgent care clinic or hospitalized since your last visit? Yes. [facility] in [address]     2. Have you seen or consulted any other health care providers outside of the [address] [place] [address] since your last visit? Include any pap smears or colon screening. No    Patient does not have an advance directive. Vitals reviewed with provider. Health Maintenance reviewed:     Health Maintenance Due   Topic    COVID-19 Vaccine (2 - Booster for [employer] series)    Flu Vaccine (1)          Wt Readings from Last 3 Encounters:   22 264 lb 3.2 oz (119.8 kg)   22 257 lb 12.8 oz (116.9 kg)   22 266 lb 3.2 oz (120.7 kg)        Temp Readings from Last 3 Encounters:   22 98.3 °F (36.8 °C) (Oral)   22 98.2 °F (36.8 °C) (Oral)   21 98.2 °F (36.8 °C) (Oral)        BP Readings from Last 3 Encounters:   22 (!) 140/82   22 (!) 144/86   21 (!) 160/80        Pulse Readings from Last 3 Encounters:   22 78   22 86   21 67        Vitals:    22 1143   Resp: 16   Weight: 264 lb 3.2 oz (119.8 kg)   Height: 5' 11\" (1.803 m)   PainSc:   0 - No pain          Learning Assessment:   :       Learning Assessment 2014   PRIMARY LEARNER Patient   PRIMARY LANGUAGE ENGLISH   LEARNER PREFERENCE PRIMARY DEMONSTRATION   ANSWERED BY patient   RELATIONSHIP SELF        Depression Screening:   :       3 most recent PHQ Screens 2022   Little interest or pleasure in doing things Not at all   Feeling down, depressed, irritable, or hopeless Not at all   Total Score PHQ 2 0        Fall Risk Assessment:   :       Fall Risk Assessment, last 12 mths 2019   Able to walk? Yes   Fall in past 12 months? No        Abuse Screening:   :       Abuse Screening Questionnaire 9/11/2019 8/28/2017   Do you ever feel afraid of your partner? N N   Are you in a relationship with someone who physically or mentally threatens you? N N   Is it safe for you to go home?  Y Y        ADL Screening:   :       ADL Assessment 8/28/2017   Feeding yourself No Help Needed   Getting from bed to chair No Help Needed   Getting dressed No Help Needed   Bathing or showering No Help Needed   Walk across the room (includes cane/walker) No Help Needed   Using the telphone No Help Needed   Taking your medications No Help Needed   Preparing meals No Help Needed   Managing money (expenses/bills) No Help Needed   Moderately strenuous housework (laundry) No Help Needed   Shopping for personal items (toiletries/medicines) No Help Needed   Shopping for groceries No Help Needed   Driving No Help Needed   Climbing a flight of stairs No Help Needed   Getting to places beyond walking distances No Help Needed

## 2022-11-07 DIAGNOSIS — I10 ESSENTIAL HYPERTENSION: ICD-10-CM

## 2022-11-07 DIAGNOSIS — E78.00 HIGH CHOLESTEROL: ICD-10-CM

## 2022-11-07 RX ORDER — ROSUVASTATIN CALCIUM 5 MG/1
TABLET, COATED ORAL
Qty: 90 TABLET | Refills: 4 | Status: SHIPPED | OUTPATIENT
Start: 2022-11-07

## 2022-11-07 RX ORDER — LOSARTAN POTASSIUM 100 MG/1
TABLET ORAL
Qty: 90 TABLET | Refills: 1 | Status: SHIPPED | OUTPATIENT
Start: 2022-11-07

## 2023-02-28 DIAGNOSIS — N52.9 ERECTILE DYSFUNCTION, UNSPECIFIED ERECTILE DYSFUNCTION TYPE: ICD-10-CM

## 2023-02-28 RX ORDER — SILDENAFIL CITRATE 20 MG/1
TABLET ORAL
Qty: 30 TABLET | Refills: 1 | Status: SHIPPED | OUTPATIENT
Start: 2023-02-28

## 2023-05-27 DIAGNOSIS — I10 ESSENTIAL (PRIMARY) HYPERTENSION: ICD-10-CM

## 2023-05-30 RX ORDER — LOSARTAN POTASSIUM 100 MG/1
TABLET ORAL
Qty: 90 TABLET | Refills: 0 | Status: SHIPPED | OUTPATIENT
Start: 2023-05-30

## 2023-06-12 DIAGNOSIS — R73.03 PREDIABETES: ICD-10-CM

## 2023-06-12 DIAGNOSIS — I10 ESSENTIAL HYPERTENSION: ICD-10-CM

## 2023-06-12 DIAGNOSIS — E78.00 HIGH CHOLESTEROL: ICD-10-CM

## 2023-06-12 DIAGNOSIS — Z12.5 PROSTATE CANCER SCREENING: ICD-10-CM

## 2023-06-12 PROBLEM — E66.01 SEVERE OBESITY (BMI 35.0-39.9) WITH COMORBIDITY (HCC): Status: ACTIVE | Noted: 2023-06-12

## 2023-07-05 RX ORDER — FAMOTIDINE 20 MG/1
TABLET, FILM COATED ORAL
Qty: 60 TABLET | Refills: 1 | Status: SHIPPED | OUTPATIENT
Start: 2023-07-05

## 2023-07-05 NOTE — TELEPHONE ENCOUNTER
PCP: Mary Morrison MD     Last appt:  6/12/2023      Future Appointments   Date Time Provider [address]   9/12/2023  8:30 AM Mary Morrison MD [facility] AMB          Requested Prescriptions     Pending Prescriptions Disp Refills    famotidine (PEPCID) 20 MG tablet [Pharmacy Med Name: FAMOTIDINE 20 MG TABLET] 60 tablet 1     Sig: TAKE 1 TABLET BY MOUTH TWICE A DAY

## 2023-07-26 NOTE — TELEPHONE ENCOUNTER
Requesting 90 days  PCP: Prem Soto MD     Last appt: 6/12/2023    Future Appointments   Date Time Provider [address] [place] [address]   9/12/2023  8:30 AM Prem Soto MD [facility] AMB          Requested Prescriptions     Pending Prescriptions Disp Refills    famotidine (PEPCID) 20 MG tablet 180 tablet 1     Sig: Take 1 tablet by mouth 2 times daily

## 2023-07-27 RX ORDER — FAMOTIDINE 20 MG/1
20 TABLET, FILM COATED ORAL 2 TIMES DAILY
Qty: 180 TABLET | Refills: 1 | Status: SHIPPED | OUTPATIENT
Start: 2023-07-27

## 2023-08-07 RX ORDER — MONTELUKAST SODIUM 10 MG/1
TABLET ORAL
Qty: 90 TABLET | Refills: 1 | Status: SHIPPED | OUTPATIENT
Start: 2023-08-07

## 2023-08-25 DIAGNOSIS — I10 ESSENTIAL (PRIMARY) HYPERTENSION: ICD-10-CM

## 2023-08-25 RX ORDER — LOSARTAN POTASSIUM 100 MG/1
TABLET ORAL
Qty: 90 TABLET | Refills: 0 | Status: SHIPPED | OUTPATIENT
Start: 2023-08-25

## 2023-09-12 ENCOUNTER — OFFICE VISIT (OUTPATIENT)
Facility: CLINIC | Age: 58
End: 2023-09-12

## 2023-09-12 VITALS
TEMPERATURE: 98.9 F | DIASTOLIC BLOOD PRESSURE: 86 MMHG | SYSTOLIC BLOOD PRESSURE: 146 MMHG | RESPIRATION RATE: 12 BRPM | BODY MASS INDEX: 37.73 KG/M2 | HEART RATE: 77 BPM | OXYGEN SATURATION: 96 % | HEIGHT: 71 IN | WEIGHT: 269.5 LBS

## 2023-09-12 DIAGNOSIS — I10 ESSENTIAL HYPERTENSION: Primary | ICD-10-CM

## 2023-09-12 DIAGNOSIS — R10.13 DYSPEPSIA: ICD-10-CM

## 2023-09-12 DIAGNOSIS — R05.3 CHRONIC COUGH: ICD-10-CM

## 2023-09-12 RX ORDER — ALBUTEROL SULFATE 90 UG/1
AEROSOL, METERED RESPIRATORY (INHALATION)
COMMUNITY
Start: 2023-09-12

## 2023-09-12 RX ORDER — BENZONATATE 100 MG/1
100 CAPSULE ORAL DAILY
COMMUNITY

## 2023-09-12 RX ORDER — FAMOTIDINE 20 MG/1
20 TABLET, FILM COATED ORAL 2 TIMES DAILY PRN
Qty: 180 TABLET | Refills: 1
Start: 2023-09-12

## 2023-09-12 RX ORDER — AMLODIPINE BESYLATE 5 MG/1
5 TABLET ORAL DAILY
Qty: 30 TABLET | Refills: 5 | Status: SHIPPED | OUTPATIENT
Start: 2023-09-12

## 2023-09-12 NOTE — PROGRESS NOTES
HPI  Mr. Jarod Owens is a 56y.o. year old male, he is seen today for follow up HTN, cough. Plan last visit:  Chronic cough, worse with allergens it appears. Add Singulair as Flonase and antihistamine did not work. Get chest x-ray given chronicity. Refer to pulmonary. Also add famotidine 20 mg twice daily in case there is a GERD component. He does need Tums once or twice a week. Continue to work on weight loss through diet changes and exercise. Blood pressure is not to goal, he would like to check it at home as he has not taken his medications yet and notify me if it remains elevated    Has seen pulmonary, added benzonatate and albuterol once in the morning -  and cough is better. Will have allergy testing and PFT's tomorrow. Famotidine didn't help cough - rare heartburn. HTN - not checking much at home -remembers 140s/80s  No chest pain, sob, HA, dizziness. Stopped singulair after cough was better with albuterol and tessalon. Chief Complaint   Patient presents with    Hypertension    Cough        Prior to Admission medications    Medication Sig Start Date End Date Taking?  Authorizing Provider   albuterol sulfate HFA (PROVENTIL;VENTOLIN;PROAIR) 108 (90 Base) MCG/ACT inhaler  9/12/23  Yes Historical Provider, MD   benzonatate (TESSALON) 100 MG capsule Take 1 capsule by mouth daily   Yes Historical Provider, MD   famotidine (PEPCID) 20 MG tablet Take 1 tablet by mouth 2 times daily as needed (gerd) 9/12/23  Yes Nargis Jones MD   amLODIPine (NORVASC) 5 MG tablet Take 1 tablet by mouth daily 9/12/23  Yes Nargis Jones MD   losartan (COZAAR) 100 MG tablet TAKE 1 TABLET BY MOUTH EVERY DAY 8/25/23  Yes Nargis Jones MD   diclofenac sodium (VOLTAREN) 1 % GEL Apply 4 g topically every 6 hours as needed 5/12/22  Yes Ar Automatic Reconciliation   fluticasone (FLONASE) 50 MCG/ACT nasal spray SPRAY 2 SPRAYS INTO EACH NOSTRIL EVERY DAY 4/23/21  Yes Ar Automatic Reconciliation

## 2023-11-21 NOTE — TELEPHONE ENCOUNTER
PCP: Hemalatha Crook MD     Last appt:  9/12/2023      Future Appointments   Date Time Provider [address]   12/29/2023  9:30 AM Hemalatha Crook MD [facility] AMB          Requested Prescriptions     Pending Prescriptions Disp Refills    sildenafil (REVATIO) 20 MG tablet [Pharmacy Med Name: SILDENAFIL 20 MG TABLET] 30 tablet      Sig: TAKE ONE TO THREE TABLETS BY MOUTH DAILY AS NEEDED

## 2023-11-22 RX ORDER — SILDENAFIL CITRATE 20 MG/1
TABLET ORAL
Qty: 30 TABLET | Refills: 1 | Status: SHIPPED | OUTPATIENT
Start: 2023-11-22

## 2023-12-07 DIAGNOSIS — I10 ESSENTIAL (PRIMARY) HYPERTENSION: ICD-10-CM

## 2023-12-07 RX ORDER — LOSARTAN POTASSIUM 100 MG/1
TABLET ORAL
Qty: 90 TABLET | Refills: 0 | Status: SHIPPED | OUTPATIENT
Start: 2023-12-07

## 2023-12-07 NOTE — TELEPHONE ENCOUNTER
PCP: Carlos Luciano MD     Last appt:  9/12/2023      Future Appointments   Date Time Provider [address]  [facility]   12/29/2023  9:30 AM Carlos Luciano MD [facility] AMB          Requested Prescriptions     Pending Prescriptions Disp Refills    losartan (COZAAR) 100 MG tablet [Pharmacy Med Name: LOSARTAN POTASSIUM 100 MG TAB] 90 tablet 0     Sig: TAKE 1 TABLET BY MOUTH EVERY DAY

## 2024-01-10 ENCOUNTER — OFFICE VISIT (OUTPATIENT)
Facility: CLINIC | Age: 59
End: 2024-01-10
Payer: COMMERCIAL

## 2024-01-10 VITALS
BODY MASS INDEX: 38.01 KG/M2 | HEIGHT: 71 IN | WEIGHT: 271.5 LBS | OXYGEN SATURATION: 97 % | RESPIRATION RATE: 12 BRPM | HEART RATE: 73 BPM | SYSTOLIC BLOOD PRESSURE: 118 MMHG | DIASTOLIC BLOOD PRESSURE: 78 MMHG | TEMPERATURE: 98.8 F

## 2024-01-10 DIAGNOSIS — Z23 NEED FOR PROPHYLACTIC VACCINATION AGAINST DIPHTHERIA-TETANUS-PERTUSSIS (DTP): ICD-10-CM

## 2024-01-10 DIAGNOSIS — E78.00 HIGH CHOLESTEROL: ICD-10-CM

## 2024-01-10 DIAGNOSIS — I10 ESSENTIAL (PRIMARY) HYPERTENSION: Primary | ICD-10-CM

## 2024-01-10 DIAGNOSIS — R73.03 PREDIABETES: ICD-10-CM

## 2024-01-10 DIAGNOSIS — R05.3 CHRONIC COUGH: ICD-10-CM

## 2024-01-10 DIAGNOSIS — J45.909 PERSISTENT ASTHMA WITHOUT COMPLICATION, UNSPECIFIED ASTHMA SEVERITY: ICD-10-CM

## 2024-01-10 DIAGNOSIS — Z12.5 PROSTATE CANCER SCREENING: ICD-10-CM

## 2024-01-10 PROCEDURE — 90715 TDAP VACCINE 7 YRS/> IM: CPT | Performed by: INTERNAL MEDICINE

## 2024-01-10 PROCEDURE — 90471 IMMUNIZATION ADMIN: CPT | Performed by: INTERNAL MEDICINE

## 2024-01-10 PROCEDURE — 99214 OFFICE O/P EST MOD 30 MIN: CPT | Performed by: INTERNAL MEDICINE

## 2024-01-10 PROCEDURE — 3078F DIAST BP <80 MM HG: CPT | Performed by: INTERNAL MEDICINE

## 2024-01-10 PROCEDURE — 3074F SYST BP LT 130 MM HG: CPT | Performed by: INTERNAL MEDICINE

## 2024-01-10 RX ORDER — LEVOCETIRIZINE DIHYDROCHLORIDE 5 MG/1
5 TABLET, FILM COATED ORAL NIGHTLY
COMMUNITY
Start: 2023-12-25

## 2024-01-10 RX ORDER — MONTELUKAST SODIUM 10 MG/1
10 TABLET ORAL
COMMUNITY
Start: 2023-12-20

## 2024-01-10 ASSESSMENT — PATIENT HEALTH QUESTIONNAIRE - PHQ9
SUM OF ALL RESPONSES TO PHQ QUESTIONS 1-9: 0
1. LITTLE INTEREST OR PLEASURE IN DOING THINGS: 0
SUM OF ALL RESPONSES TO PHQ QUESTIONS 1-9: 0
SUM OF ALL RESPONSES TO PHQ QUESTIONS 1-9: 0
SUM OF ALL RESPONSES TO PHQ9 QUESTIONS 1 & 2: 0
2. FEELING DOWN, DEPRESSED OR HOPELESS: 0
SUM OF ALL RESPONSES TO PHQ QUESTIONS 1-9: 0

## 2024-01-10 NOTE — PROGRESS NOTES
HPI  Mr. Collins Goldberg is a 58 y.o. year old male, he is seen today for HTN. At home has been 140s/80s - 130s/70s at pulmonary appointment.   Cough is much better - has seen pulmonary - on montelukast and levocetirizine which has helped.   No chest pain or sob. Also prescribed what sounds like preventive inhaler but he doesn't know name - hasn't picked up yet.   No heartburn or indigestion unless he eats something unusual. Not on a daily basis.   No dizziness, weakness, lightheadedness, edema, HA.       Chief Complaint   Patient presents with    Hypertension        Prior to Admission medications    Medication Sig Start Date End Date Taking? Authorizing Provider   montelukast (SINGULAIR) 10 MG tablet Take 1 tablet by mouth nightly 12/20/23  Yes Provider, MD Igor   levocetirizine (XYZAL) 5 MG tablet Take 1 tablet by mouth nightly 12/25/23  Yes Provider, MD Igor   losartan (COZAAR) 100 MG tablet TAKE 1 TABLET BY MOUTH EVERY DAY 12/7/23  Yes Migdalia Rubin MD   sildenafil (REVATIO) 20 MG tablet TAKE ONE TO THREE TABLETS BY MOUTH DAILY AS NEEDED 11/22/23  Yes Migdalia Rubin MD   albuterol sulfate HFA (PROVENTIL;VENTOLIN;PROAIR) 108 (90 Base) MCG/ACT inhaler  9/12/23  Yes Provider, MD Igor   famotidine (PEPCID) 20 MG tablet Take 1 tablet by mouth 2 times daily as needed (gerd) 9/12/23  Yes Migdalia Rubin MD   amLODIPine (NORVASC) 5 MG tablet Take 1 tablet by mouth daily 9/12/23  Yes Migdalia Rubin MD   diclofenac sodium (VOLTAREN) 1 % GEL Apply 4 g topically every 6 hours as needed 5/12/22  Yes Automatic Reconciliation, Ar   fluticasone (FLONASE) 50 MCG/ACT nasal spray SPRAY 2 SPRAYS INTO EACH NOSTRIL EVERY DAY 4/23/21  Yes Automatic Reconciliation, Ar   rosuvastatin (CRESTOR) 5 MG tablet TAKE 1 TABLET BY MOUTH EVERY DAY AT NIGHT 11/7/22  Yes Automatic Reconciliation, Ar         Allergies   Allergen Reactions    Atorvastatin Myalgia    Sulfa Antibiotics Hives         REVIEW OF

## 2024-01-10 NOTE — PATIENT INSTRUCTIONS
prickly feeling, vision or hearing problems, trouble breathing.  Common side effects may include:  nausea, vomiting, diarrhea, and/or abdominal pain;  pain, redness, or swelling where the shot was given;  headache or tiredness;  body aches; or  fever.  This is not a complete list of side effects and others may occur. Call your doctor for medical advice about side effects. You may report vaccine side effects to the  Department of Health and Human Services at [phone].  What other drugs will affect tetanus, diphtheria, acellular pertussis vaccine?  Before receiving this vaccine, tell your vaccination provider about all other vaccines you have recently received.  Also, tell the vaccination provider if you have recently received drugs or treatments that can weaken the immune system, including:  steroid medicine;  cancer treatments;  medicine to treat psoriasis, rheumatoid arthritis, or other autoimmune disorders; or  medicines to treat or prevent organ transplant rejection.  If you are using any of these medications, you may not be able to receive the vaccine, or may need to wait until the other treatments are finished.  This list is not complete. Other drugs may affect this vaccine, including prescription and over-the-counter medicines, vitamins, and herbal products. Not all possible drug interactions are listed here.  Where can I get more information?  Your vaccination provider, pharmacist, or doctor can provide more information about this vaccine. Additional information is available from your local health department or the Centers for Disease Control and Prevention.  Remember, keep this and all other medicines out of the reach of children, never share your medicines with others, and use this medication only for the indication prescribed.   Every effort has been made to ensure that the information provided by [employer] ('[employer]') is accurate, up-to-date, and complete, but no guarantee is made to that

## 2024-01-10 NOTE — PROGRESS NOTES
Collins Goldberg  Identified pt with two pt identifiers(name and ).     Chief Complaint   Patient presents with    Hypertension       Reviewed record In preparation for visit and have obtained necessary documentation.     1. Have you been to the ER, urgent care clinic or hospitalized since your last visit? No     2. Have you seen or consulted any other health care providers outside of the [facility] since your last visit? Include any pap smears or colon screening. No    Vitals reviewed with provider.    Health Maintenance reviewed: After verbal order read back of  Dr Rubin, patient received TDAP  in right arm. Boostrix 0.5ml NDC 16228-667-37 lot [id number] exp 2026. Patient tolerated procedure without complaints and received VIS.     Health Maintenance Due   Topic    HIV screen     DTaP/Tdap/Td vaccine (2 - Td or Tdap)    A1C test (Diabetic or Prediabetic)     Lipids           Wt Readings from Last 3 Encounters:   01/10/24 123.2 kg (271 lb 8 oz)   23 122.2 kg (269 lb 8 oz)   23 121.1 kg (267 lb)        Temp Readings from Last 3 Encounters:   01/10/24 98.8 °F (37.1 °C) (Oral)   23 98.9 °F (37.2 °C) (Oral)   23 98.9 °F (37.2 °C) (Oral)        BP Readings from Last 3 Encounters:   01/10/24 (!) 147/91   23 (!) 146/86   23 (!) 150/90        Pulse Readings from Last 3 Encounters:   01/10/24 73   23 77   23 82      [unfilled]       Learning Assessment:   :         2023     9:00 AM   [facility] AMB LEARNING ASSESSMENT   Primary Learner Patient   level of education GRADUATED HIGH SCHOOL OR GED   Barriers Factors NONE   co-learner caregiver No   Primary Language ENGLISH   Learning Preference DEMONSTRATION   Answered By patient   Relationship to Learner SELF         Fall Risk Assessment:   :          No data to display                   Abuse Screening:   :          No data to display                   ADL Screening:   :         2023     8:00 AM   ADL

## 2024-01-11 LAB
ALBUMIN SERPL-MCNC: 3.6 G/DL (ref 3.5–5)
ALBUMIN/GLOB SERPL: 1.1 (ref 1.1–2.2)
ALP SERPL-CCNC: 77 U/L (ref 45–117)
ALT SERPL-CCNC: 23 U/L (ref 12–78)
ANION GAP SERPL CALC-SCNC: 5 MMOL/L (ref 5–15)
AST SERPL-CCNC: 15 U/L (ref 15–37)
BILIRUB SERPL-MCNC: 0.5 MG/DL (ref 0.2–1)
BUN SERPL-MCNC: 14 MG/DL (ref 6–20)
BUN/CREAT SERPL: 12 (ref 12–20)
CALCIUM SERPL-MCNC: 8.9 MG/DL (ref 8.5–10.1)
CHLORIDE SERPL-SCNC: 105 MMOL/L (ref 97–108)
CHOLEST SERPL-MCNC: 227 MG/DL
CO2 SERPL-SCNC: 29 MMOL/L (ref 21–32)
CREAT SERPL-MCNC: 1.16 MG/DL (ref 0.7–1.3)
EST. AVERAGE GLUCOSE BLD GHB EST-MCNC: 126 MG/DL
GLOBULIN SER CALC-MCNC: 3.4 G/DL (ref 2–4)
GLUCOSE SERPL-MCNC: 108 MG/DL (ref 65–100)
HBA1C MFR BLD: 6 % (ref 4–5.6)
HDLC SERPL-MCNC: 52 MG/DL
HDLC SERPL: 4.4 (ref 0–5)
LDLC SERPL CALC-MCNC: 132.4 MG/DL (ref 0–100)
POTASSIUM SERPL-SCNC: 4.3 MMOL/L (ref 3.5–5.1)
PROT SERPL-MCNC: 7 G/DL (ref 6.4–8.2)
PSA SERPL-MCNC: 1 NG/ML (ref 0.01–4)
SODIUM SERPL-SCNC: 139 MMOL/L (ref 136–145)
TRIGL SERPL-MCNC: 213 MG/DL
VLDLC SERPL CALC-MCNC: 42.6 MG/DL

## 2024-03-04 DIAGNOSIS — I10 ESSENTIAL HYPERTENSION: ICD-10-CM

## 2024-03-04 DIAGNOSIS — I10 ESSENTIAL (PRIMARY) HYPERTENSION: ICD-10-CM

## 2024-03-04 RX ORDER — LOSARTAN POTASSIUM 100 MG/1
TABLET ORAL
Qty: 90 TABLET | Refills: 0 | Status: SHIPPED | OUTPATIENT
Start: 2024-03-04

## 2024-03-04 RX ORDER — AMLODIPINE BESYLATE 5 MG/1
5 TABLET ORAL DAILY
Qty: 90 TABLET | Refills: 1 | Status: SHIPPED | OUTPATIENT
Start: 2024-03-04

## 2024-03-04 NOTE — TELEPHONE ENCOUNTER
Requested Prescriptions     Pending Prescriptions Disp Refills    losartan (COZAAR) 100 MG tablet [Pharmacy Med Name: LOSARTAN POTASSIUM 100 MG TAB] 90 tablet 0     Sig: TAKE 1 TABLET BY MOUTH EVERY DAY    amLODIPine (NORVASC) 5 MG tablet [Pharmacy Med Name: AMLODIPINE BESYLATE 5 MG TAB] 90 tablet 1     Sig: TAKE 1 TABLET BY MOUTH EVERY DAY

## 2024-06-09 DIAGNOSIS — I10 ESSENTIAL (PRIMARY) HYPERTENSION: ICD-10-CM

## 2024-06-10 RX ORDER — LOSARTAN POTASSIUM 100 MG/1
TABLET ORAL
Qty: 90 TABLET | Refills: 0 | Status: SHIPPED | OUTPATIENT
Start: 2024-06-10

## 2024-07-10 ENCOUNTER — OFFICE VISIT (OUTPATIENT)
Facility: CLINIC | Age: 59
End: 2024-07-10
Payer: COMMERCIAL

## 2024-07-10 VITALS
TEMPERATURE: 98.9 F | SYSTOLIC BLOOD PRESSURE: 136 MMHG | RESPIRATION RATE: 12 BRPM | WEIGHT: 269 LBS | HEIGHT: 71 IN | DIASTOLIC BLOOD PRESSURE: 86 MMHG | BODY MASS INDEX: 37.66 KG/M2 | OXYGEN SATURATION: 97 % | HEART RATE: 77 BPM

## 2024-07-10 DIAGNOSIS — R73.03 PREDIABETES: ICD-10-CM

## 2024-07-10 DIAGNOSIS — E78.00 HIGH CHOLESTEROL: ICD-10-CM

## 2024-07-10 DIAGNOSIS — E66.01 SEVERE OBESITY (BMI 35.0-39.9) WITH COMORBIDITY (HCC): ICD-10-CM

## 2024-07-10 DIAGNOSIS — I10 ESSENTIAL HYPERTENSION: Primary | ICD-10-CM

## 2024-07-10 DIAGNOSIS — N52.9 ERECTILE DYSFUNCTION, UNSPECIFIED ERECTILE DYSFUNCTION TYPE: ICD-10-CM

## 2024-07-10 LAB
ALBUMIN SERPL-MCNC: 3.9 G/DL (ref 3.5–5)
ALBUMIN/GLOB SERPL: 1.2 (ref 1.1–2.2)
ALP SERPL-CCNC: 81 U/L (ref 45–117)
ALT SERPL-CCNC: 36 U/L (ref 12–78)
ANION GAP SERPL CALC-SCNC: 6 MMOL/L (ref 5–15)
AST SERPL-CCNC: 26 U/L (ref 15–37)
BILIRUB SERPL-MCNC: 0.5 MG/DL (ref 0.2–1)
BUN SERPL-MCNC: 13 MG/DL (ref 6–20)
BUN/CREAT SERPL: 11 (ref 12–20)
CALCIUM SERPL-MCNC: 9.1 MG/DL (ref 8.5–10.1)
CHLORIDE SERPL-SCNC: 106 MMOL/L (ref 97–108)
CHOLEST SERPL-MCNC: 256 MG/DL
CO2 SERPL-SCNC: 27 MMOL/L (ref 21–32)
CREAT SERPL-MCNC: 1.15 MG/DL (ref 0.7–1.3)
EST. AVERAGE GLUCOSE BLD GHB EST-MCNC: 117 MG/DL
GLOBULIN SER CALC-MCNC: 3.3 G/DL (ref 2–4)
GLUCOSE SERPL-MCNC: 109 MG/DL (ref 65–100)
HBA1C MFR BLD: 5.7 % (ref 4–5.6)
HDLC SERPL-MCNC: 45 MG/DL
HDLC SERPL: 5.7 (ref 0–5)
LDLC SERPL CALC-MCNC: 145.2 MG/DL (ref 0–100)
POTASSIUM SERPL-SCNC: 4.3 MMOL/L (ref 3.5–5.1)
PROT SERPL-MCNC: 7.2 G/DL (ref 6.4–8.2)
SODIUM SERPL-SCNC: 139 MMOL/L (ref 136–145)
TRIGL SERPL-MCNC: 329 MG/DL
VLDLC SERPL CALC-MCNC: 65.8 MG/DL

## 2024-07-10 PROCEDURE — 3079F DIAST BP 80-89 MM HG: CPT | Performed by: INTERNAL MEDICINE

## 2024-07-10 PROCEDURE — 99214 OFFICE O/P EST MOD 30 MIN: CPT | Performed by: INTERNAL MEDICINE

## 2024-07-10 PROCEDURE — 3075F SYST BP GE 130 - 139MM HG: CPT | Performed by: INTERNAL MEDICINE

## 2024-07-10 RX ORDER — TADALAFIL 2.5 MG/1
2.5 TABLET ORAL DAILY
Qty: 30 TABLET | Refills: 3 | Status: SHIPPED | OUTPATIENT
Start: 2024-07-10

## 2024-07-10 SDOH — ECONOMIC STABILITY: FOOD INSECURITY: WITHIN THE PAST 12 MONTHS, THE FOOD YOU BOUGHT JUST DIDN'T LAST AND YOU DIDN'T HAVE MONEY TO GET MORE.: NEVER TRUE

## 2024-07-10 SDOH — ECONOMIC STABILITY: INCOME INSECURITY: HOW HARD IS IT FOR YOU TO PAY FOR THE VERY BASICS LIKE FOOD, HOUSING, MEDICAL CARE, AND HEATING?: NOT HARD AT ALL

## 2024-07-10 SDOH — ECONOMIC STABILITY: FOOD INSECURITY: WITHIN THE PAST 12 MONTHS, YOU WORRIED THAT YOUR FOOD WOULD RUN OUT BEFORE YOU GOT MONEY TO BUY MORE.: NEVER TRUE

## 2024-07-10 SDOH — ECONOMIC STABILITY: HOUSING INSECURITY
IN THE LAST 12 MONTHS, WAS THERE A TIME WHEN YOU DID NOT HAVE A STEADY PLACE TO SLEEP OR SLEPT IN A SHELTER (INCLUDING NOW)?: NO

## 2024-07-10 NOTE — PROGRESS NOTES
Collins Goldberg  Identified pt with two pt identifiers(name and ).     Chief Complaint   Patient presents with    Hypertension    Cholesterol Problem    Blood Sugar Problem       Reviewed record In preparation for visit and have obtained necessary documentation.     1. Have you been to the ER, urgent care clinic or hospitalized since your last visit? No     2. Have you seen or consulted any other health care providers outside of the [facility] since your last visit? Include any pap smears or colon screening. No    Vitals reviewed with provider.    Health Maintenance reviewed:     Health Maintenance Due   Topic    Pneumococcal 0-64 years Vaccine (1 of 2 - PCV)          Wt Readings from Last 3 Encounters:   07/10/24 122 kg (269 lb)   01/10/24 123.2 kg (271 lb 8 oz)   23 122.2 kg (269 lb 8 oz)        Temp Readings from Last 3 Encounters:   07/10/24 98.9 °F (37.2 °C) (Oral)   01/10/24 98.8 °F (37.1 °C) (Oral)   23 98.9 °F (37.2 °C) (Oral)        BP Readings from Last 3 Encounters:   07/10/24 (!) 138/91   01/10/24 118/78   23 (!) 146/86        Pulse Readings from Last 3 Encounters:   07/10/24 77   01/10/24 73   23 77      [unfilled]       Learning Assessment:   :         2023     9:00 AM   [facility] AMB LEARNING ASSESSMENT   Primary Learner Patient   level of education GRADUATED HIGH SCHOOL OR GED   Barriers Factors NONE   co-learner caregiver No   Primary Language ENGLISH   Learning Preference DEMONSTRATION   Answered By patient   Relationship to Learner SELF         Fall Risk Assessment:   :          No data to display                   Abuse Screening:   :          No data to display                   ADL Screening:   :         2023     8:00 AM   ADL ASSESSMENT   Feeding yourself No Help Needed   Getting from bed to chair No Help Needed   Getting dressed No Help Needed   Bathing or showering No Help Needed   Walk across the room (includes cane/walker) No Help Needed   Using 
out-of-pocket through [employer].  Check cholesterol, not on a statin now.  Has lost a few pounds, working on weight loss through better diet choices.    Health Maintenance Due   Topic Date Due    Pneumococcal 0-64 years Vaccine (1 of 2 - PCV) Never done        Follow-up and Dispositions    Return in about 6 months (around 1/10/2025) for HTN, IFG, CHOLESTEROL.            Reviewed plan of care. Patient has provided input and agrees with goals.     The nurse provided the patient and/or family with advanced directive information if needed and encouraged the patient to provide a copy to the office when available.

## 2024-07-15 RX ORDER — ROSUVASTATIN CALCIUM 10 MG/1
10 TABLET, COATED ORAL DAILY
Qty: 90 TABLET | Refills: 1 | Status: SHIPPED | OUTPATIENT
Start: 2024-07-15

## 2024-07-15 NOTE — TELEPHONE ENCOUNTER
PCP: Migdalia Rubin MD     Last appt:  7/10/2024      Future Appointments   Date Time Provider Department Center   1/23/2025  8:30 AM Migdalia Rubin MD [facility] AMB          Requested Prescriptions     Pending Prescriptions Disp Refills    rosuvastatin (CRESTOR) 10 MG tablet 90 tablet 1     Sig: Take 1 tablet by mouth daily

## 2024-07-15 NOTE — TELEPHONE ENCOUNTER
----- Message from Migdalia Rubin MD sent at 7/12/2024  5:47 PM EDT -----  The 10-year ASCVD risk score (Chantal ROBBINS, et al., 2019) is: 14.1% This refers to your 10 year risk of heart attack, stroke or death from heart disease. This is in a range where cholesterol lowering medications are recommended.  If you are willing I will prescribe rosuvastatin 10mg daily. Normal kidney and liver labs and A1C improved meaning blood sugar is improved.    Likely hypervolemic hyponatremia in setting of CHF exacerbation. Improved from 122->127 without intervention  - Improving. 130 1/24  - CTM

## 2024-07-22 ENCOUNTER — TELEPHONE (OUTPATIENT)
Facility: CLINIC | Age: 59
End: 2024-07-22

## 2024-07-22 NOTE — TELEPHONE ENCOUNTER
PA for Tadalafil has been denied.     We denied your request because we did not see what we need to approve the drug you  asked for, tadalafil 2.5 milligram tablet. This drug is not a benefit on your plan, it is not  covered (when used for a diagnosis of erectile dysfunction [ED]). We based this decision  on your Pharmacy Health Plan Benefits.    Per last office note, patient will use GoodRx to get the prescription.

## 2024-09-06 DIAGNOSIS — I10 ESSENTIAL HYPERTENSION: ICD-10-CM

## 2024-09-06 DIAGNOSIS — I10 ESSENTIAL (PRIMARY) HYPERTENSION: ICD-10-CM

## 2024-09-06 RX ORDER — LOSARTAN POTASSIUM 100 MG/1
TABLET ORAL
Qty: 90 TABLET | Refills: 0 | Status: SHIPPED | OUTPATIENT
Start: 2024-09-06

## 2024-09-06 RX ORDER — AMLODIPINE BESYLATE 5 MG/1
5 TABLET ORAL DAILY
Qty: 90 TABLET | Refills: 1 | Status: SHIPPED | OUTPATIENT
Start: 2024-09-06

## 2024-09-06 NOTE — TELEPHONE ENCOUNTER
PCP: Migdalia Rubin MD     Last appt: 7/10/2024    Future Appointments   Date Time Provider Department Center   1/23/2025  8:30 AM Migdalia Rubin MD [facility] DEP          Requested Prescriptions     Pending Prescriptions Disp Refills    amLODIPine (NORVASC) 5 MG tablet [Pharmacy Med Name: AMLODIPINE BESYLATE 5 MG TAB] 90 tablet 1     Sig: TAKE 1 TABLET BY MOUTH EVERY DAY

## 2024-09-06 NOTE — TELEPHONE ENCOUNTER
PCP: Migdalia Rubin MD     Last appt:  7/10/2024      Future Appointments   Date Time Provider Department Center   1/23/2025  8:30 AM Migdalia Rubin MD [facility] DEP          Requested Prescriptions     Pending Prescriptions Disp Refills    losartan (COZAAR) 100 MG tablet [Pharmacy Med Name: LOSARTAN POTASSIUM 100 MG TAB] 90 tablet 0     Sig: TAKE 1 TABLET BY MOUTH EVERY DAY

## 2024-10-11 RX ORDER — ROSUVASTATIN CALCIUM 10 MG/1
10 TABLET, COATED ORAL DAILY
Qty: 90 TABLET | Refills: 1 | Status: SHIPPED | OUTPATIENT
Start: 2024-10-11

## 2024-10-11 NOTE — TELEPHONE ENCOUNTER
PCP: Migdalia Rubin MD     Last appt:  7/10/2024      Future Appointments   Date Time Provider Department Center   1/23/2025  8:30 AM Migdalia Rubin MD [facility] DEP          Requested Prescriptions     Pending Prescriptions Disp Refills    rosuvastatin (CRESTOR) 10 MG tablet [Pharmacy Med Name: ROSUVASTATIN CALCIUM 10 MG TAB] 90 tablet 1     Sig: TAKE 1 TABLET BY MOUTH EVERY DAY

## 2024-11-06 DIAGNOSIS — N52.9 ERECTILE DYSFUNCTION, UNSPECIFIED ERECTILE DYSFUNCTION TYPE: ICD-10-CM

## 2024-11-06 RX ORDER — TADALAFIL 2.5 MG/1
1 TABLET ORAL DAILY
Qty: 30 TABLET | Refills: 3 | Status: SHIPPED | OUTPATIENT
Start: 2024-11-06

## 2024-12-07 DIAGNOSIS — I10 ESSENTIAL (PRIMARY) HYPERTENSION: ICD-10-CM

## 2024-12-09 RX ORDER — LOSARTAN POTASSIUM 100 MG/1
TABLET ORAL
Qty: 90 TABLET | Refills: 0 | Status: SHIPPED | OUTPATIENT
Start: 2024-12-09

## 2024-12-09 NOTE — TELEPHONE ENCOUNTER
PCP: Migdalia Rubin MD     Last appt:  7/10/2024      Future Appointments   Date Time Provider Department Center   1/23/2025  8:30 AM Migdalia Rubin MD [facility] DEP          Requested Prescriptions     Pending Prescriptions Disp Refills    losartan (COZAAR) 100 MG tablet [Pharmacy Med Name: LOSARTAN POTASSIUM 100 MG TAB] 90 tablet 0     Sig: TAKE 1 TABLET BY MOUTH EVERY DAY

## 2025-01-27 ENCOUNTER — TELEPHONE (OUTPATIENT)
Facility: CLINIC | Age: 60
End: 2025-01-27

## 2025-01-27 NOTE — TELEPHONE ENCOUNTER
----- Message from Dnevnik sent at 1/27/2025  8:32 AM EST -----  Regarding: [facility] Appointment Request  [facility] Appointment Request    Patient needs appointment for [facility] Appointment Type: Existing Condition Follow Up.    Patient Requested Dates(s): soonest appjasmin  Patient Requested Time: soonest kenyetta  Provider Name: Migdalia Rubin MD    Reason for Appointment Request: Established Patient - Available appointments did not meet patient need    Pt want to have 6 months follow up.   --------------------------------------------------------------------------------------------------------------------------    Relationship to Patient: Self     Call Back Information: OK to leave message on voicemail  Preferred Call Back Number: Phone [phone]

## 2025-02-13 SDOH — ECONOMIC STABILITY: TRANSPORTATION INSECURITY
IN THE PAST 12 MONTHS, HAS LACK OF TRANSPORTATION KEPT YOU FROM MEETINGS, WORK, OR FROM GETTING THINGS NEEDED FOR DAILY LIVING?: NO

## 2025-02-13 SDOH — ECONOMIC STABILITY: INCOME INSECURITY: IN THE LAST 12 MONTHS, WAS THERE A TIME WHEN YOU WERE NOT ABLE TO PAY THE MORTGAGE OR RENT ON TIME?: NO

## 2025-02-13 SDOH — ECONOMIC STABILITY: FOOD INSECURITY: WITHIN THE PAST 12 MONTHS, YOU WORRIED THAT YOUR FOOD WOULD RUN OUT BEFORE YOU GOT MONEY TO BUY MORE.: PATIENT DECLINED

## 2025-02-13 SDOH — ECONOMIC STABILITY: TRANSPORTATION INSECURITY
IN THE PAST 12 MONTHS, HAS THE LACK OF TRANSPORTATION KEPT YOU FROM MEDICAL APPOINTMENTS OR FROM GETTING MEDICATIONS?: NO

## 2025-02-13 SDOH — ECONOMIC STABILITY: FOOD INSECURITY: WITHIN THE PAST 12 MONTHS, THE FOOD YOU BOUGHT JUST DIDN'T LAST AND YOU DIDN'T HAVE MONEY TO GET MORE.: PATIENT DECLINED

## 2025-02-14 ENCOUNTER — OFFICE VISIT (OUTPATIENT)
Facility: CLINIC | Age: 60
End: 2025-02-14
Payer: COMMERCIAL

## 2025-02-14 VITALS
WEIGHT: 274.5 LBS | TEMPERATURE: 98.4 F | BODY MASS INDEX: 38.43 KG/M2 | OXYGEN SATURATION: 97 % | HEART RATE: 71 BPM | RESPIRATION RATE: 16 BRPM | DIASTOLIC BLOOD PRESSURE: 90 MMHG | HEIGHT: 71 IN | SYSTOLIC BLOOD PRESSURE: 138 MMHG

## 2025-02-14 DIAGNOSIS — L91.8 SKIN TAG: ICD-10-CM

## 2025-02-14 DIAGNOSIS — R73.03 PREDIABETES: ICD-10-CM

## 2025-02-14 DIAGNOSIS — I10 ESSENTIAL HYPERTENSION: Primary | ICD-10-CM

## 2025-02-14 DIAGNOSIS — M25.512 CHRONIC LEFT SHOULDER PAIN: ICD-10-CM

## 2025-02-14 DIAGNOSIS — E78.00 HIGH CHOLESTEROL: ICD-10-CM

## 2025-02-14 DIAGNOSIS — Z12.5 PROSTATE CANCER SCREENING: ICD-10-CM

## 2025-02-14 DIAGNOSIS — G89.29 CHRONIC LEFT SHOULDER PAIN: ICD-10-CM

## 2025-02-14 LAB
ALBUMIN SERPL-MCNC: 3.8 G/DL (ref 3.5–5)
ALBUMIN/GLOB SERPL: 1.2 (ref 1.1–2.2)
ALP SERPL-CCNC: 72 U/L (ref 45–117)
ALT SERPL-CCNC: 24 U/L (ref 12–78)
ANION GAP SERPL CALC-SCNC: 8 MMOL/L (ref 2–12)
AST SERPL-CCNC: 17 U/L (ref 15–37)
BILIRUB SERPL-MCNC: 0.6 MG/DL (ref 0.2–1)
BUN SERPL-MCNC: 17 MG/DL (ref 6–20)
BUN/CREAT SERPL: 15 (ref 12–20)
CALCIUM SERPL-MCNC: 9.3 MG/DL (ref 8.5–10.1)
CHLORIDE SERPL-SCNC: 105 MMOL/L (ref 97–108)
CHOLEST SERPL-MCNC: 154 MG/DL
CO2 SERPL-SCNC: 27 MMOL/L (ref 21–32)
CREAT SERPL-MCNC: 1.12 MG/DL (ref 0.7–1.3)
EST. AVERAGE GLUCOSE BLD GHB EST-MCNC: 123 MG/DL
GLOBULIN SER CALC-MCNC: 3.3 G/DL (ref 2–4)
GLUCOSE SERPL-MCNC: 101 MG/DL (ref 65–100)
HBA1C MFR BLD: 5.9 % (ref 4–5.6)
HDLC SERPL-MCNC: 55 MG/DL
HDLC SERPL: 2.8 (ref 0–5)
LDLC SERPL CALC-MCNC: 70.4 MG/DL (ref 0–100)
POTASSIUM SERPL-SCNC: 4.3 MMOL/L (ref 3.5–5.1)
PROT SERPL-MCNC: 7.1 G/DL (ref 6.4–8.2)
PSA SERPL-MCNC: 1 NG/ML (ref 0.01–4)
SODIUM SERPL-SCNC: 140 MMOL/L (ref 136–145)
TRIGL SERPL-MCNC: 143 MG/DL
VLDLC SERPL CALC-MCNC: 28.6 MG/DL

## 2025-02-14 PROCEDURE — 3079F DIAST BP 80-89 MM HG: CPT | Performed by: INTERNAL MEDICINE

## 2025-02-14 PROCEDURE — 3075F SYST BP GE 130 - 139MM HG: CPT | Performed by: INTERNAL MEDICINE

## 2025-02-14 PROCEDURE — 99214 OFFICE O/P EST MOD 30 MIN: CPT | Performed by: INTERNAL MEDICINE

## 2025-02-14 RX ORDER — AMLODIPINE BESYLATE 10 MG/1
10 TABLET ORAL DAILY
Qty: 90 TABLET | Refills: 1 | Status: SHIPPED | OUTPATIENT
Start: 2025-02-14

## 2025-02-14 SDOH — ECONOMIC STABILITY: FOOD INSECURITY: WITHIN THE PAST 12 MONTHS, THE FOOD YOU BOUGHT JUST DIDN'T LAST AND YOU DIDN'T HAVE MONEY TO GET MORE.: NEVER TRUE

## 2025-02-14 SDOH — ECONOMIC STABILITY: FOOD INSECURITY: WITHIN THE PAST 12 MONTHS, YOU WORRIED THAT YOUR FOOD WOULD RUN OUT BEFORE YOU GOT MONEY TO BUY MORE.: NEVER TRUE

## 2025-02-14 ASSESSMENT — PATIENT HEALTH QUESTIONNAIRE - PHQ9
SUM OF ALL RESPONSES TO PHQ QUESTIONS 1-9: 0
2. FEELING DOWN, DEPRESSED OR HOPELESS: NOT AT ALL
SUM OF ALL RESPONSES TO PHQ9 QUESTIONS 1 & 2: 0
SUM OF ALL RESPONSES TO PHQ QUESTIONS 1-9: 0
SUM OF ALL RESPONSES TO PHQ QUESTIONS 1-9: 0
1. LITTLE INTEREST OR PLEASURE IN DOING THINGS: NOT AT ALL
SUM OF ALL RESPONSES TO PHQ QUESTIONS 1-9: 0

## 2025-02-14 NOTE — PROGRESS NOTES
HPI  Mr. Collins Goldberg is a 59 y.o. year old male, he is seen today for follow-up hypertension, IFG, cholesterol.  Plan last visit:  Blood pressure is at goal, continue current medications.  Try Cialis daily for ED.  He will call back if expensive.  Continue pain out-of-pocket through [facility].  Check cholesterol, not on a statin now.  Has lost a few pounds, working on weight loss through better diet choices.    Today:  No chest pain, sob, dizziness, weakness, lightheadedness.   No HA, edema. Not checking BP lately - used to be 130s/80s at home.     Chief Complaint   Patient presents with    Hypertension    IFG        Prior to Admission medications    Medication Sig Start Date End Date Taking? Authorizing Provider   amLODIPine (NORVASC) 10 MG tablet Take 1 tablet by mouth daily 2/14/25  Yes Migdalia Rubin MD   losartan (COZAAR) 100 MG tablet TAKE 1 TABLET BY MOUTH EVERY DAY 12/9/24  Yes Migdalia Rubin MD   Tadalafil 2.5 MG TABS TAKE 1 TABLET BY MOUTH DAILY 11/6/24  Yes Migdalia Rubin MD   rosuvastatin (CRESTOR) 10 MG tablet TAKE 1 TABLET BY MOUTH EVERY DAY 10/11/24  Yes Migdalia Rubin MD   albuterol sulfate HFA (PROVENTIL;VENTOLIN;PROAIR) 108 (90 Base) MCG/ACT inhaler  9/12/23  Yes Provider, MD Igor   famotidine (PEPCID) 20 MG tablet Take 1 tablet by mouth 2 times daily as needed (gerd) 9/12/23  Yes Migdalia Rubin MD   diclofenac sodium (VOLTAREN) 1 % GEL Apply 4 g topically every 6 hours as needed 5/12/22  Yes Automatic Reconciliation, Ar         Allergies   Allergen Reactions    Atorvastatin Myalgia    Sulfa Antibiotics Hives         REVIEW OF SYSTEMS:  Per HPI    PHYSICAL EXAM:  BP (!) 138/90   Pulse 71   Temp 98.4 °F (36.9 °C) (Oral)   Resp 16   Ht 1.803 m (5' 11\")   Wt 124.5 kg (274 lb 8 oz)   SpO2 97%   BMI 38.28 kg/m²   Constitutional: Appears well-developed and well-nourished. No distress.   HENT:   Head: Normocephalic and atraumatic.   Eyes: No scleral icterus.   Neck:

## 2025-02-14 NOTE — PROGRESS NOTES
Collins Goldberg  Identified pt with two pt identifiers(name and ).  Chief Complaint   Patient presents with    Hypertension    IFG       1. Have you been to the ER, urgent care clinic since your last visit?  Hospitalized since your last visit? NO    2. Have you seen or consulted any other health care providers outside of the [facility] since your last visit?  Include any pap smears or colon screening. NO    Patient does not have an advance directive.    Vitals reviewed with provider.    Health Maintenance reviewed:     Health Maintenance Due   Topic    Hepatitis B vaccine (1 of 3 - 19+ 3-dose series)    Pneumococcal 50+ years Vaccine (1 of 2 - PCV)    Shingles vaccine (1 of 2)    Flu vaccine (1)    COVID-19 Vaccine (2 -  season)    Depression Screen           Wt Readings from Last 3 Encounters:   25 124.5 kg (274 lb 8 oz)   07/10/24 122 kg (269 lb)   01/10/24 123.2 kg (271 lb 8 oz)        Temp Readings from Last 3 Encounters:   07/10/24 98.9 °F (37.2 °C) (Oral)   01/10/24 98.8 °F (37.1 °C) (Oral)   23 98.9 °F (37.2 °C) (Oral)        BP Readings from Last 3 Encounters:   07/10/24 136/86   01/10/24 118/78   23 (!) 146/86        Pulse Readings from Last 3 Encounters:   07/10/24 77   01/10/24 73   23 77             No data to display                  Learning Assessment:         2023     9:00 AM   [facility] AMB LEARNING ASSESSMENT   Primary Learner Patient   level of education GRADUATED HIGH SCHOOL OR GED   Barriers Factors NONE   co-learner caregiver No   Primary Language ENGLISH   Learning Preference DEMONSTRATION   Answered By patient   Relationship to Learner SELF         Fall Risk Assessment:   :          No data to display                Abuse Screening:   :          No data to display                   ADL Screening:   :         2023     8:00 AM   ADL ASSESSMENT   Feeding yourself No Help Needed   Getting from bed to chair No Help Needed   Getting dressed No

## 2025-03-03 DIAGNOSIS — N52.9 ERECTILE DYSFUNCTION, UNSPECIFIED ERECTILE DYSFUNCTION TYPE: ICD-10-CM

## 2025-03-03 RX ORDER — TADALAFIL 2.5 MG/1
1 TABLET ORAL DAILY
Qty: 30 TABLET | Refills: 3 | Status: SHIPPED | OUTPATIENT
Start: 2025-03-03

## 2025-03-03 NOTE — TELEPHONE ENCOUNTER
Future Appointments:  Future Appointments   Date Time Provider Department Center   3/18/2025 10:50 AM Migdalia Rubin MD [facility] DEP        Last Appointment With Me:  2/14/2025     Requested Prescriptions     Pending Prescriptions Disp Refills    Tadalafil 2.5 MG TABS [Pharmacy Med Name: TADALAFIL 2.5 MG TABLET] 30 tablet 3     Sig: TAKE 1 TABLET BY MOUTH DAILY

## 2025-03-07 DIAGNOSIS — I10 ESSENTIAL (PRIMARY) HYPERTENSION: ICD-10-CM

## 2025-03-07 NOTE — TELEPHONE ENCOUNTER
PCP: Migdalia Rubin MD     Last appt:  2/14/2025      Future Appointments   Date Time Provider Department Center   3/18/2025 10:50 AM Migdalia Rubin MD [facility] DEP          Requested Prescriptions     Pending Prescriptions Disp Refills    losartan (COZAAR) 100 MG tablet [Pharmacy Med Name: LOSARTAN POTASSIUM 100 MG TAB] 90 tablet 0     Sig: TAKE 1 TABLET BY MOUTH EVERY DAY

## 2025-03-09 RX ORDER — LOSARTAN POTASSIUM 100 MG/1
TABLET ORAL
Qty: 90 TABLET | Refills: 0 | Status: SHIPPED | OUTPATIENT
Start: 2025-03-09

## 2025-03-18 ENCOUNTER — OFFICE VISIT (OUTPATIENT)
Facility: CLINIC | Age: 60
End: 2025-03-18
Payer: COMMERCIAL

## 2025-03-18 VITALS
TEMPERATURE: 98.5 F | HEIGHT: 71 IN | OXYGEN SATURATION: 95 % | WEIGHT: 271.5 LBS | DIASTOLIC BLOOD PRESSURE: 78 MMHG | HEART RATE: 87 BPM | BODY MASS INDEX: 38.01 KG/M2 | SYSTOLIC BLOOD PRESSURE: 126 MMHG | RESPIRATION RATE: 12 BRPM

## 2025-03-18 DIAGNOSIS — I10 ESSENTIAL (PRIMARY) HYPERTENSION: Primary | ICD-10-CM

## 2025-03-18 DIAGNOSIS — R73.03 PREDIABETES: ICD-10-CM

## 2025-03-18 DIAGNOSIS — E78.00 HIGH CHOLESTEROL: ICD-10-CM

## 2025-03-18 PROCEDURE — 99213 OFFICE O/P EST LOW 20 MIN: CPT | Performed by: INTERNAL MEDICINE

## 2025-03-18 PROCEDURE — 3074F SYST BP LT 130 MM HG: CPT | Performed by: INTERNAL MEDICINE

## 2025-03-18 PROCEDURE — 3078F DIAST BP <80 MM HG: CPT | Performed by: INTERNAL MEDICINE

## 2025-03-18 NOTE — PROGRESS NOTES
Collins Goldberg  Identified pt with two pt identifiers(name and ).     Chief Complaint   Patient presents with    Hypertension       Reviewed record In preparation for visit and have obtained necessary documentation.     1. Have you been to the ER, urgent care clinic or hospitalized since your last visit? No     2. Have you seen or consulted any other health care providers outside of the [facility] since your last visit? Include any pap smears or colon screening. No    Vitals reviewed with provider.    Health Maintenance reviewed:     Health Maintenance Due   Topic    Hepatitis B vaccine (1 of 3 - 19+ 3-dose series)    Pneumococcal 50+ years Vaccine (1 of 2 - PCV)    Shingles vaccine (1 of 2)          Wt Readings from Last 3 Encounters:   25 123.2 kg (271 lb 8 oz)   25 124.5 kg (274 lb 8 oz)   07/10/24 122 kg (269 lb)        Temp Readings from Last 3 Encounters:   25 98.5 °F (36.9 °C) (Oral)   25 98.4 °F (36.9 °C) (Oral)   07/10/24 98.9 °F (37.2 °C) (Oral)        BP Readings from Last 3 Encounters:   25 135/73   25 (!) 138/90   07/10/24 136/86        Pulse Readings from Last 3 Encounters:   25 87   25 71   07/10/24 77      [unfilled]       Learning Assessment:   :         2023     9:00 AM   [facility] AMB LEARNING ASSESSMENT   Primary Learner Patient   level of education GRADUATED HIGH SCHOOL OR GED   Barriers Factors NONE   co-learner caregiver No   Primary Language ENGLISH   Learning Preference DEMONSTRATION   Answered By patient   Relationship to Learner SELF         Fall Risk Assessment:   :          No data to display                   Abuse Screening:   :          No data to display                   ADL Screening:   :         2023     8:00 AM   ADL ASSESSMENT   Feeding yourself No Help Needed   Getting from bed to chair No Help Needed   Getting dressed No Help Needed   Bathing or showering No Help Needed   Walk across the room (includes

## 2025-03-18 NOTE — PROGRESS NOTES
HPI  Mr. Collins Goldberg is a 59 y.o. year old male, he is seen today for follow up HTN.   Denies chest pain, shortness of breath, dizziness/lightheadedness, headaches, visual changes, edema.     BP at home has been higher - 150/90 before medications    Increased amlodipine to 10mg about a month ago.   No edema.     Chief Complaint   Patient presents with    Hypertension        Prior to Admission medications    Medication Sig Start Date End Date Taking? Authorizing Provider   losartan (COZAAR) 100 MG tablet TAKE 1 TABLET BY MOUTH EVERY DAY 3/9/25  Yes Migdalia Rubin MD   Tadalafil 2.5 MG TABS TAKE 1 TABLET BY MOUTH DAILY 3/3/25  Yes Migdalia Rubin MD   amLODIPine (NORVASC) 10 MG tablet Take 1 tablet by mouth daily 2/14/25  Yes Migdalia Rubin MD   rosuvastatin (CRESTOR) 10 MG tablet TAKE 1 TABLET BY MOUTH EVERY DAY 10/11/24  Yes Migdalia Rubin MD   albuterol sulfate HFA (PROVENTIL;VENTOLIN;PROAIR) 108 (90 Base) MCG/ACT inhaler  9/12/23  Yes Provider, MD Igor   famotidine (PEPCID) 20 MG tablet Take 1 tablet by mouth 2 times daily as needed (gerd) 9/12/23  Yes Migdalia Rubin MD   diclofenac sodium (VOLTAREN) 1 % GEL Apply 4 g topically every 6 hours as needed 5/12/22  Yes Automatic Reconciliation, Ar         Allergies   Allergen Reactions    Atorvastatin Myalgia    Sulfa Antibiotics Hives         REVIEW OF SYSTEMS:  Per HPI    PHYSICAL EXAM:  /78   Pulse 87   Temp 98.5 °F (36.9 °C) (Oral)   Resp 12   Ht 1.803 m (5' 11\")   Wt 123.2 kg (271 lb 8 oz)   SpO2 95%   BMI 37.87 kg/m²   Constitutional: Appears well-developed and well-nourished. No distress.   HENT:   Head: Normocephalic and atraumatic.   Eyes: No scleral icterus.   Neck: no lad, no tm, supple   Cardiovascular: Normal S1/S2, regular rhythm.  No murmurs, rubs, or gallops.  Pulmonary/Chest: Effort normal and breath sounds normal. No respiratory distress. No wheezes, rhonchi, or rales.   Ext: No edema.   Neurological:

## 2025-06-09 DIAGNOSIS — I10 ESSENTIAL (PRIMARY) HYPERTENSION: ICD-10-CM

## 2025-06-09 RX ORDER — LOSARTAN POTASSIUM 100 MG/1
100 TABLET ORAL DAILY
Qty: 90 TABLET | Refills: 0 | Status: SHIPPED | OUTPATIENT
Start: 2025-06-09

## 2025-06-12 DIAGNOSIS — I10 ESSENTIAL HYPERTENSION: ICD-10-CM

## 2025-06-12 RX ORDER — AMLODIPINE BESYLATE 10 MG/1
10 TABLET ORAL DAILY
Qty: 90 TABLET | Refills: 0 | Status: SHIPPED | OUTPATIENT
Start: 2025-06-12

## 2025-06-12 NOTE — TELEPHONE ENCOUNTER
PCP: Migdalia Rubin MD     Last appt: 3/18/2025    Future Appointments   Date Time Provider Department Center   9/18/2025  9:30 AM Migdalia Rubin MD [facility] DEP          Requested Prescriptions     Pending Prescriptions Disp Refills    amLODIPine (NORVASC) 10 MG tablet [Pharmacy Med Name: AMLODIPINE BESYLATE 10 MG TAB] 90 tablet 1     Sig: TAKE 1 TABLET BY MOUTH EVERY DAY

## 2025-06-19 ENCOUNTER — HOSPITAL ENCOUNTER (EMERGENCY)
Facility: HOSPITAL | Age: 60
Discharge: HOME | End: 2025-06-19
Payer: COMMERCIAL

## 2025-06-19 VITALS
DIASTOLIC BLOOD PRESSURE: 86 MMHG | SYSTOLIC BLOOD PRESSURE: 171 MMHG | HEIGHT: 71 IN | OXYGEN SATURATION: 97 % | RESPIRATION RATE: 16 BRPM | TEMPERATURE: 98.2 F | HEART RATE: 88 BPM | WEIGHT: 272 LBS | BODY MASS INDEX: 38.08 KG/M2

## 2025-06-19 DIAGNOSIS — I10 ESSENTIAL HYPERTENSION: ICD-10-CM

## 2025-06-19 DIAGNOSIS — S81.812A LACERATION OF LEFT LOWER EXTREMITY, INITIAL ENCOUNTER: Primary | ICD-10-CM

## 2025-06-19 DIAGNOSIS — Z23 NEED FOR TETANUS BOOSTER: ICD-10-CM

## 2025-06-19 PROCEDURE — 90471 IMMUNIZATION ADMIN: CPT | Performed by: PHYSICIAN ASSISTANT

## 2025-06-19 PROCEDURE — 90715 TDAP VACCINE 7 YRS/> IM: CPT | Performed by: PHYSICIAN ASSISTANT

## 2025-06-19 PROCEDURE — 12002 RPR S/N/AX/GEN/TRNK2.6-7.5CM: CPT

## 2025-06-19 PROCEDURE — 6360000002 HC RX W HCPCS: Performed by: PHYSICIAN ASSISTANT

## 2025-06-19 PROCEDURE — 99284 EMERGENCY DEPT VISIT MOD MDM: CPT

## 2025-06-19 RX ORDER — LIDOCAINE HYDROCHLORIDE AND EPINEPHRINE 20; 5 MG/ML; UG/ML
20 INJECTION, SOLUTION EPIDURAL; INFILTRATION; INTRACAUDAL; PERINEURAL ONCE
Status: COMPLETED | OUTPATIENT
Start: 2025-06-19 | End: 2025-06-19

## 2025-06-19 RX ADMIN — TETANUS TOXOID, REDUCED DIPHTHERIA TOXOID AND ACELLULAR PERTUSSIS VACCINE, ADSORBED 0.5 ML: 5; 2.5; 8; 8; 2.5 SUSPENSION INTRAMUSCULAR at 19:22

## 2025-06-19 RX ADMIN — LIDOCAINE HYDROCHLORIDE,EPINEPHRINE BITARTRATE 20 ML: 20; .005 INJECTION, SOLUTION EPIDURAL; INFILTRATION; INTRACAUDAL; PERINEURAL at 19:24

## 2025-06-19 ASSESSMENT — LIFESTYLE VARIABLES
HOW MANY STANDARD DRINKS CONTAINING ALCOHOL DO YOU HAVE ON A TYPICAL DAY: PATIENT DOES NOT DRINK
HOW OFTEN DO YOU HAVE A DRINK CONTAINING ALCOHOL: NEVER

## 2025-06-19 ASSESSMENT — PAIN SCALES - GENERAL
PAINLEVEL_OUTOF10: 0
PAINLEVEL_OUTOF10: 1

## 2025-06-19 ASSESSMENT — PAIN DESCRIPTION - LOCATION: LOCATION: LEG

## 2025-06-19 ASSESSMENT — PAIN DESCRIPTION - ORIENTATION: ORIENTATION: RIGHT

## 2025-06-19 NOTE — ED PROVIDER NOTES
discharge instructions have been discussed, understanding conveyed, and agreed upon. The patient is to follow up as recommended or return to ER should their symptoms worsen.      PATIENT REFERRED TO:  Migdalia Rubin MD  [address] [place] [id number] [phone]    Call   PRIMARY CARE: call to schedule follow up regarding your ER visit and high blood pressure. Have stitches removed in 10 - 14 days       DISCHARGE MEDICATIONS:     Medication List        ASK your doctor about these medications      albuterol sulfate  (90 Base) MCG/ACT inhaler  Commonly known as: PROVENTIL;VENTOLIN;PROAIR     amLODIPine 10 MG tablet  Commonly known as: NORVASC  TAKE 1 TABLET BY MOUTH EVERY DAY     diclofenac sodium 1 % Gel  Commonly known as: VOLTAREN     famotidine 20 MG tablet  Commonly known as: PEPCID  Take 1 tablet by mouth 2 times daily as needed (gerd)     losartan 100 MG tablet  Commonly known as: COZAAR  TAKE 1 TABLET BY MOUTH EVERY DAY     rosuvastatin 10 MG tablet  Commonly known as: CRESTOR  TAKE 1 TABLET BY MOUTH EVERY DAY     Tadalafil 2.5 MG Tabs  TAKE 1 TABLET BY MOUTH DAILY                DISCONTINUED MEDICATIONS:  Discharge Medication List as of 6/19/2025  8:05 PM        I have seen and evaluated the patient autonomously. My supervision physician was on site and available for consultation if needed.     I am the Primary Clinician of Record.   ETELVINA Brar (electronically signed)    (Please note that parts of this dictation were completed with voice recognition software. Quite often unanticipated grammatical, syntax, homophones, and other interpretive errors are inadvertently transcribed by the computer software. Please disregards these errors. Please excuse any errors that have escaped final proofreading.)       Arsh Gordon PA  06/19/25 2034

## 2025-06-20 NOTE — DISCHARGE INSTRUCTIONS
Thank You!    It was a pleasure taking care of you in our Emergency Department today. We know that when you come to our Emergency Department, you are entrusting us with your health, comfort, and safety. Our physicians and nurses honor that trust, and truly appreciate the opportunity to care for you and your loved ones.      We also value your feedback. If you receive a survey about your Emergency Department experience today, please fill it out.  We care about our patients' feedback, and we listen to what you have to say.  Thank you.    Arsh Gordon PA-C      ________________________________________________________________________  I have included a copy of your lab results and/or radiologic studies from today's visit so you can have them easily available at your follow-up visit. We hope you feel better and please do not hesitate to contact the ED if you have any questions at all!    No results found for this or any previous visit (from the past 12 hours).    No orders to display     [unfilled]  The exam and treatment you received in the Emergency Department were for an urgent problem and are not intended as complete care. It is important that you follow up with a doctor, nurse practitioner, or physician assistant for ongoing care. If your symptoms become worse or you do not improve as expected and you are unable to reach your usual health care provider, you should return to the Emergency Department. We are available 24 hours a day.    Please take your discharge instructions with you when you go to your follow-up appointment.     If a prescription has been provided, please have it filled as soon as possible to prevent a delay in treatment. Read the entire medication instruction sheet provided to you by the pharmacy. If you have any questions or reservations about taking the medication due to side effects or interactions with other medications, please call your primary care physician or contact the ER to speak with the

## 2025-07-05 RX ORDER — ROSUVASTATIN CALCIUM 10 MG/1
10 TABLET, COATED ORAL DAILY
Qty: 90 TABLET | Refills: 1 | Status: SHIPPED | OUTPATIENT
Start: 2025-07-05

## 2025-08-07 DIAGNOSIS — N52.9 ERECTILE DYSFUNCTION, UNSPECIFIED ERECTILE DYSFUNCTION TYPE: ICD-10-CM

## 2025-08-07 RX ORDER — TADALAFIL 2.5 MG/1
1 TABLET ORAL DAILY
Qty: 30 TABLET | Refills: 3 | Status: SHIPPED | OUTPATIENT
Start: 2025-08-07

## 2025-09-05 DIAGNOSIS — I10 ESSENTIAL (PRIMARY) HYPERTENSION: ICD-10-CM

## 2025-09-05 RX ORDER — LOSARTAN POTASSIUM 100 MG/1
100 TABLET ORAL DAILY
Qty: 90 TABLET | Refills: 0 | Status: SHIPPED | OUTPATIENT
Start: 2025-09-05